# Patient Record
Sex: FEMALE | Race: WHITE | NOT HISPANIC OR LATINO | URBAN - METROPOLITAN AREA
[De-identification: names, ages, dates, MRNs, and addresses within clinical notes are randomized per-mention and may not be internally consistent; named-entity substitution may affect disease eponyms.]

---

## 2018-07-01 ENCOUNTER — OUTPATIENT (OUTPATIENT)
Dept: OUTPATIENT SERVICES | Facility: HOSPITAL | Age: 38
LOS: 1 days | End: 2018-07-01
Payer: COMMERCIAL

## 2018-07-10 DIAGNOSIS — Z71.89 OTHER SPECIFIED COUNSELING: ICD-10-CM

## 2019-07-01 ENCOUNTER — OUTPATIENT (OUTPATIENT)
Dept: OUTPATIENT SERVICES | Facility: HOSPITAL | Age: 39
LOS: 1 days | End: 2019-07-01
Payer: COMMERCIAL

## 2019-07-01 PROCEDURE — H0002: CPT

## 2019-10-30 DIAGNOSIS — Z71.89 OTHER SPECIFIED COUNSELING: ICD-10-CM

## 2020-11-01 PROCEDURE — G9005: CPT

## 2020-12-01 ENCOUNTER — OUTPATIENT (OUTPATIENT)
Dept: OUTPATIENT SERVICES | Facility: HOSPITAL | Age: 40
LOS: 1 days | End: 2020-12-01
Payer: COMMERCIAL

## 2020-12-01 PROCEDURE — H0002: CPT

## 2021-01-12 DIAGNOSIS — Z71.89 OTHER SPECIFIED COUNSELING: ICD-10-CM

## 2021-12-01 PROCEDURE — G9005: CPT

## 2022-02-08 ENCOUNTER — NON-APPOINTMENT (OUTPATIENT)
Age: 42
End: 2022-02-08

## 2022-02-08 PROBLEM — Z00.00 ENCOUNTER FOR PREVENTIVE HEALTH EXAMINATION: Status: ACTIVE | Noted: 2022-02-08

## 2022-02-09 ENCOUNTER — APPOINTMENT (OUTPATIENT)
Dept: INFECTIOUS DISEASE | Facility: CLINIC | Age: 42
End: 2022-02-09

## 2022-02-16 ENCOUNTER — NON-APPOINTMENT (OUTPATIENT)
Age: 42
End: 2022-02-16

## 2022-02-18 ENCOUNTER — NON-APPOINTMENT (OUTPATIENT)
Age: 42
End: 2022-02-18

## 2022-02-22 ENCOUNTER — APPOINTMENT (OUTPATIENT)
Dept: INFECTIOUS DISEASE | Facility: CLINIC | Age: 42
End: 2022-02-22

## 2022-02-24 ENCOUNTER — NON-APPOINTMENT (OUTPATIENT)
Age: 42
End: 2022-02-24

## 2022-02-25 ENCOUNTER — NON-APPOINTMENT (OUTPATIENT)
Age: 42
End: 2022-02-25

## 2022-02-28 ENCOUNTER — APPOINTMENT (OUTPATIENT)
Dept: INFECTIOUS DISEASE | Facility: CLINIC | Age: 42
End: 2022-02-28

## 2022-03-01 ENCOUNTER — NON-APPOINTMENT (OUTPATIENT)
Age: 42
End: 2022-03-01

## 2022-03-07 ENCOUNTER — APPOINTMENT (OUTPATIENT)
Dept: INFECTIOUS DISEASE | Facility: CLINIC | Age: 42
End: 2022-03-07

## 2022-03-15 ENCOUNTER — NON-APPOINTMENT (OUTPATIENT)
Age: 42
End: 2022-03-15

## 2022-03-16 ENCOUNTER — FORM ENCOUNTER (OUTPATIENT)
Age: 42
End: 2022-03-16

## 2022-03-16 ENCOUNTER — NON-APPOINTMENT (OUTPATIENT)
Age: 42
End: 2022-03-16

## 2022-03-17 ENCOUNTER — APPOINTMENT (OUTPATIENT)
Dept: INFECTIOUS DISEASE | Facility: CLINIC | Age: 42
End: 2022-03-17
Payer: COMMERCIAL

## 2022-03-17 ENCOUNTER — LABORATORY RESULT (OUTPATIENT)
Age: 42
End: 2022-03-17

## 2022-03-17 ENCOUNTER — NON-APPOINTMENT (OUTPATIENT)
Age: 42
End: 2022-03-17

## 2022-03-17 VITALS
SYSTOLIC BLOOD PRESSURE: 141 MMHG | TEMPERATURE: 97.3 F | DIASTOLIC BLOOD PRESSURE: 94 MMHG | BODY MASS INDEX: 22.66 KG/M2 | HEIGHT: 66 IN | WEIGHT: 141 LBS | HEART RATE: 101 BPM | OXYGEN SATURATION: 96 %

## 2022-03-17 DIAGNOSIS — I10 ESSENTIAL (PRIMARY) HYPERTENSION: ICD-10-CM

## 2022-03-17 LAB
25(OH)D3 SERPL-MCNC: 9 NG/ML
ALBUMIN SERPL ELPH-MCNC: 3.6 G/DL
ALP BLD-CCNC: 496 U/L
ALT SERPL-CCNC: 17 U/L
ANION GAP SERPL CALC-SCNC: 13 MMOL/L
AST SERPL-CCNC: 146 U/L
BASOPHILS # BLD AUTO: 0.02 K/UL
BASOPHILS NFR BLD AUTO: 0.3 %
BILIRUB SERPL-MCNC: 2.1 MG/DL
BUN SERPL-MCNC: 4 MG/DL
CALCIUM SERPL-MCNC: 9.1 MG/DL
CALCIUM SERPL-MCNC: 9.1 MG/DL
CD3 CELLS # BLD: 602 /UL
CD3 CELLS NFR BLD: 60 %
CD3+CD4+ CELLS # BLD: 391 /UL
CD3+CD4+ CELLS NFR BLD: 39 %
CD3+CD4+ CELLS/CD3+CD8+ CLL SPEC: 2.18 RATIO
CD3+CD8+ CELLS # SPEC: 180 /UL
CD3+CD8+ CELLS NFR BLD: 18 %
CHLORIDE SERPL-SCNC: 101 MMOL/L
CHOLEST SERPL-MCNC: 247 MG/DL
CO2 SERPL-SCNC: 24 MMOL/L
CREAT SERPL-MCNC: 0.37 MG/DL
CRP SERPL-MCNC: 5 MG/L
EGFR: 131 ML/MIN/1.73M2
EOSINOPHIL # BLD AUTO: 0.01 K/UL
EOSINOPHIL NFR BLD AUTO: 0.2 %
GLUCOSE SERPL-MCNC: 97 MG/DL
HCT VFR BLD CALC: 35 %
HDLC SERPL-MCNC: 23 MG/DL
HGB BLD-MCNC: 12.2 G/DL
IMM GRANULOCYTES NFR BLD AUTO: 0.6 %
LDLC SERPL CALC-MCNC: 194 MG/DL
LYMPHOCYTES # BLD AUTO: 1.26 K/UL
LYMPHOCYTES NFR BLD AUTO: 18.9 %
MAGNESIUM SERPL-MCNC: 1.9 MG/DL
MAN DIFF?: NORMAL
MCHC RBC-ENTMCNC: 31.9 PG
MCHC RBC-ENTMCNC: 34.9 GM/DL
MCV RBC AUTO: 91.4 FL
MONOCYTES # BLD AUTO: 0.93 K/UL
MONOCYTES NFR BLD AUTO: 14 %
NEUTROPHILS # BLD AUTO: 4.4 K/UL
NEUTROPHILS NFR BLD AUTO: 66 %
NONHDLC SERPL-MCNC: 225 MG/DL
PARATHYROID HORMONE INTACT: 28 PG/ML
PLATELET # BLD AUTO: 171 K/UL
POTASSIUM SERPL-SCNC: 4 MMOL/L
PROT SERPL-MCNC: 8.8 G/DL
RBC # BLD: 3.83 M/UL
RBC # FLD: 13.4 %
SODIUM SERPL-SCNC: 138 MMOL/L
T3FREE SERPL-MCNC: 2.18 PG/ML
T3RU NFR SERPL: 1 TBI
T4 SERPL-MCNC: 7.3 UG/DL
TRIGL SERPL-MCNC: 155 MG/DL
TSH SERPL-ACNC: 3.12 UIU/ML
VIT B12 SERPL-MCNC: 1362 PG/ML
WBC # FLD AUTO: 6.66 K/UL

## 2022-03-17 PROCEDURE — 99204 OFFICE O/P NEW MOD 45 MIN: CPT

## 2022-03-17 PROCEDURE — 99072 ADDL SUPL MATRL&STAF TM PHE: CPT

## 2022-03-17 NOTE — HISTORY OF PRESENT ILLNESS
[FreeTextEntry1] : 42 yo female here for HIV initial consult/ transfer of care. \par she takes Odefsey daily but ran out 3 days ago \par she has intermittent nausea and vomiting after taking medications but states Odefsey helps her. \par \par PMH : Dx 2/15/2015 via unprotected sexual relations.  denies any other medication use besides Odefsey.  \par denies any recent travel, hospitalizations.  denies any blood transfusions. \par PCP : Mercy Acuña -[phone]\par Vaccines : denies COVID or Influenza vaccines\par Screenings : Pap and mammo appt next week. \par PSH : left knee sx 10/15/2021- due to trauma.  \par PFH : Mother - HTN, DM, Father- HTN, DM\par Social hx : denies any IVDU, smoking, ETOH.   Confirms tattoos\par Sexual hx : Single. Heterosexual.  Last sexual encounter one year ago.  Confirms condom use.  \par Partner : none curretly \par Occupation : Unemployed\par Lives with : 2 children \par Who is aware of HIV status : friend\par \par NKDA\par Current Rx : Odefsey, Lisinopril\par \par 3/17/2022 Plan \par HIV \par 1. continue current treatment - refills given \par 2. do blood work and cultures\par 3. f/u one month \par \par Thrush\par 1. start Nystatin swish and spit/swallow QID\par \par Cerumen impaction \par 1. referral to ENT given \par \par Thyromegaly \par 1. US thyroid ordered \par 2. blood work ordered\par 3. referral to Endocrine given \par \par HTN\par 1. continue current treatment - refills given \par \par Neuralgia\par pt states numbness and tingling bilat feet- worse in AM \par 1. do blood work for further evaluation.  \par \par \par \par

## 2022-03-17 NOTE — REVIEW OF SYSTEMS
[Body Aches] : body aches [Joint Pain] : joint pain [Negative] : Heme/Lymph [FreeTextEntry2] : left knee pain [FreeTextEntry9] : right knee pain

## 2022-03-17 NOTE — ASSESSMENT
[FreeTextEntry1] : HIV initial consult/ transfer of care  [Treatment Education] : treatment education [Treatment Adherence] : treatment adherence [Rx Dose / Side Effects] : Rx dose/side effects [Medical Care Issues] : medical care issues [Drug Interactions / Side Effects] : drug interactions/side effects [HIV Education] : HIV Education

## 2022-03-17 NOTE — PHYSICAL EXAM
[General Appearance - Alert] : alert [General Appearance - In No Acute Distress] : in no acute distress [General Appearance - Well Nourished] : well nourished [General Appearance - Well-Appearing] : healthy appearing [Sclera] : the sclera and conjunctiva were normal [PERRL With Normal Accommodation] : pupils were equal in size, round, reactive to light [Extraocular Movements] : extraocular movements were intact [Outer Ear] : the ears and nose were normal in appearance [Hearing Threshold Finger Rub Not [place]] : hearing was normal [Examination Of The Oral Cavity] : the lips and gums were normal [Both Tympanic Membranes Were Examined] : both tympanic membranes were normal [Neck Appearance] : the appearance of the neck was normal [Neck Cervical Mass (___cm)] : no neck mass was observed [Jugular Venous Distention Increased] : there was no jugular-venous distention [Respiration, Rhythm And Depth] : normal respiratory rhythm and effort [Exaggerated Use Of Accessory Muscles For Inspiration] : no accessory muscle use [Auscultation Breath Sounds / Voice Sounds] : lungs were clear to auscultation bilaterally [Heart Rate And Rhythm] : heart rate was normal and rhythm regular [Heart Sounds] : normal S1 and S2 [Heart Sounds Gallop] : no gallops [Murmurs] : no murmurs [Heart Sounds Pericardial Friction Rub] : no pericardial rub [Edema] : there was no peripheral edema [Bowel Sounds] : normal bowel sounds [Abdomen Soft] : soft [Abdomen Tenderness] : non-tender [Costovertebral Angle Tenderness] : no CVA tenderness [No Palpable Adenopathy] : no palpable adenopathy [Range of Motion to Joints] : range of motion to joints [Nail Clubbing] : no clubbing  or cyanosis of the fingernails [FreeTextEntry1] : mild inflammation of left knee, tenderness, diminished strength 4/5, full ROJM [Skin Color & Pigmentation] : normal skin color and pigmentation [] : no rash [Skin Lesions] : no skin lesions [Cranial Nerves] : cranial nerves 2-12 were intact [Sensation] : the sensory exam was normal to light touch and pinprick [Motor Exam] : the motor exam was normal [No Focal Deficits] : no focal deficits [Oriented To Time, Place, And Person] : oriented to person, place, and time [Affect] : the affect was normal

## 2022-03-18 ENCOUNTER — NON-APPOINTMENT (OUTPATIENT)
Age: 42
End: 2022-03-18

## 2022-03-18 LAB
APPEARANCE: ABNORMAL
BACTERIA: NEGATIVE
BILIRUBIN URINE: ABNORMAL
BLOOD URINE: NEGATIVE
C TRACH RRNA SPEC QL NAA+PROBE: NOT DETECTED
C TRACH RRNA SPEC QL NAA+PROBE: NOT DETECTED
COLOR: YELLOW
ERYTHROCYTE [SEDIMENTATION RATE] IN BLOOD BY WESTERGREN METHOD: 105 MM/HR
ESTIMATED AVERAGE GLUCOSE: 97 MG/DL
GLUCOSE QUALITATIVE U: NEGATIVE
HBA1C MFR BLD HPLC: 5 %
HBV CORE IGG+IGM SER QL: NONREACTIVE
HBV SURFACE AB SER QL: NONREACTIVE
HBV SURFACE AG SER QL: NONREACTIVE
HCV AB SER QL: NONREACTIVE
HCV S/CO RATIO: 0.2 S/CO
HEPATITIS A IGG ANTIBODY: REACTIVE
HEPB DNA PCR INT: NOT DETECTED
HEPB DNA PCR LOG: NOT DETECTED LOGIU/ML
HIV1 RNA # SERPL NAA+PROBE: ABNORMAL
HIV1 RNA # SERPL NAA+PROBE: NORMAL
HYALINE CASTS: 0 /LPF
KETONES URINE: NEGATIVE
LEUKOCYTE ESTERASE URINE: ABNORMAL
MEV IGG FLD QL IA: 88 AU/ML
MEV IGG+IGM SER-IMP: POSITIVE
MICROSCOPIC-UA: NORMAL
MUV AB SER-ACNC: POSITIVE
MUV IGG SER QL IA: 90.2 AU/ML
N GONORRHOEA RRNA SPEC QL NAA+PROBE: NOT DETECTED
N GONORRHOEA RRNA SPEC QL NAA+PROBE: NOT DETECTED
NITRITE URINE: NEGATIVE
PH URINE: 6.5
PROTEIN URINE: ABNORMAL
RED BLOOD CELLS URINE: 1 /HPF
RUBV IGG FLD-ACNC: 2.1 INDEX
RUBV IGG SER-IMP: POSITIVE
SOURCE AMPLIFICATION: NORMAL
SOURCE AMPLIFICATION: NORMAL
SOURCE ORAL: NORMAL
SPECIFIC GRAVITY URINE: 1.01
SQUAMOUS EPITHELIAL CELLS: 6 /HPF
T GONDII AB SER-IMP: NEGATIVE
T GONDII AB SER-IMP: NEGATIVE
T GONDII IGG SER QL: <3 IU/ML
T GONDII IGM SER QL: <3 AU/ML
T PALLIDUM AB SER QL IA: NEGATIVE
T VAGINALIS RRNA SPEC QL NAA+PROBE: NOT DETECTED
THYROGLOB AB SERPL-ACNC: <20 IU/ML
THYROPEROXIDASE AB SERPL IA-ACNC: <10 IU/ML
UROBILINOGEN URINE: ABNORMAL
VIRAL LOAD INTERP: NORMAL
VIRAL LOAD LOG: 5.22
VZV AB TITR SER: POSITIVE
VZV IGG SER IF-ACNC: 2459 INDEX
WHITE BLOOD CELLS URINE: 7 /HPF

## 2022-03-21 ENCOUNTER — NON-APPOINTMENT (OUTPATIENT)
Age: 42
End: 2022-03-21

## 2022-03-21 LAB
ANA SER IF-ACNC: NEGATIVE
M TB IFN-G BLD-IMP: NEGATIVE
QUANTIFERON TB PLUS MITOGEN MINUS NIL: 7.12 IU/ML
QUANTIFERON TB PLUS NIL: 0.08 IU/ML
QUANTIFERON TB PLUS TB1 MINUS NIL: 0 IU/ML
QUANTIFERON TB PLUS TB2 MINUS NIL: 0 IU/ML

## 2022-03-22 ENCOUNTER — NON-APPOINTMENT (OUTPATIENT)
Age: 42
End: 2022-03-22

## 2022-03-23 ENCOUNTER — NON-APPOINTMENT (OUTPATIENT)
Age: 42
End: 2022-03-23

## 2022-03-23 LAB — HLA-B*57:01 QL: NEGATIVE

## 2022-03-25 ENCOUNTER — NON-APPOINTMENT (OUTPATIENT)
Age: 42
End: 2022-03-25

## 2022-03-29 ENCOUNTER — NON-APPOINTMENT (OUTPATIENT)
Age: 42
End: 2022-03-29

## 2022-04-01 ENCOUNTER — NON-APPOINTMENT (OUTPATIENT)
Age: 42
End: 2022-04-01

## 2022-04-05 ENCOUNTER — NON-APPOINTMENT (OUTPATIENT)
Age: 42
End: 2022-04-05

## 2022-04-06 LAB — VIT B6 SERPL-MCNC: 2.3 UG/L

## 2022-04-12 ENCOUNTER — NON-APPOINTMENT (OUTPATIENT)
Age: 42
End: 2022-04-12

## 2022-04-14 ENCOUNTER — NON-APPOINTMENT (OUTPATIENT)
Age: 42
End: 2022-04-14

## 2022-04-15 ENCOUNTER — NON-APPOINTMENT (OUTPATIENT)
Age: 42
End: 2022-04-15

## 2022-04-17 ENCOUNTER — FORM ENCOUNTER (OUTPATIENT)
Age: 42
End: 2022-04-17

## 2022-04-18 ENCOUNTER — NON-APPOINTMENT (OUTPATIENT)
Age: 42
End: 2022-04-18

## 2022-04-18 ENCOUNTER — APPOINTMENT (OUTPATIENT)
Dept: INFECTIOUS DISEASE | Facility: CLINIC | Age: 42
End: 2022-04-18
Payer: COMMERCIAL

## 2022-04-18 VITALS
SYSTOLIC BLOOD PRESSURE: 136 MMHG | WEIGHT: 139 LBS | OXYGEN SATURATION: 97 % | TEMPERATURE: 97.6 F | DIASTOLIC BLOOD PRESSURE: 87 MMHG | HEIGHT: 66 IN | BODY MASS INDEX: 22.34 KG/M2 | HEART RATE: 101 BPM

## 2022-04-18 DIAGNOSIS — E01.0 IODINE-DEFICIENCY RELATED DIFFUSE (ENDEMIC) GOITER: ICD-10-CM

## 2022-04-18 DIAGNOSIS — E78.2 MIXED HYPERLIPIDEMIA: ICD-10-CM

## 2022-04-18 DIAGNOSIS — B37.0 CANDIDAL STOMATITIS: ICD-10-CM

## 2022-04-18 DIAGNOSIS — M79.2 NEURALGIA AND NEURITIS, UNSPECIFIED: ICD-10-CM

## 2022-04-18 DIAGNOSIS — R70.0 ELEVATED ERYTHROCYTE SEDIMENTATION RATE: ICD-10-CM

## 2022-04-18 DIAGNOSIS — R74.8 ABNORMAL LEVELS OF OTHER SERUM ENZYMES: ICD-10-CM

## 2022-04-18 LAB
HIV GENOSURE ARCHIVE 1: NORMAL
HIV1 PROVIR DNA RT + PR + IN MUT DET SEQ: NORMAL
HIV1 PROVIRAL DNA GENTYP BLD MC NAR: NORMAL

## 2022-04-18 PROCEDURE — 99214 OFFICE O/P EST MOD 30 MIN: CPT

## 2022-04-18 RX ORDER — MEDROXYPROGESTERONE ACETATE 150 MG/ML
150 INJECTION, SUSPENSION INTRAMUSCULAR
Qty: 1 | Refills: 0 | Status: COMPLETED | COMMUNITY
Start: 2022-02-01

## 2022-04-18 RX ORDER — OXYCODONE AND ACETAMINOPHEN 5; 325 MG/1; MG/1
5-325 TABLET ORAL
Qty: 30 | Refills: 0 | Status: COMPLETED | COMMUNITY
Start: 2021-12-07

## 2022-04-18 RX ORDER — RISPERIDONE 1 MG/1
1 TABLET, FILM COATED ORAL
Qty: 30 | Refills: 0 | Status: COMPLETED | COMMUNITY
Start: 2021-11-26

## 2022-04-18 NOTE — HISTORY OF PRESENT ILLNESS
[FreeTextEntry1] : 42 yo female here for HIV f/u consult and results discussion\par taking Odefsey daily with one missed doses \par denies any SE from medication\par \par \par From previous consult 3/17/2022 : \par 42 yo female here for HIV initial consult/ transfer of care. \par she takes Odefsey daily but ran out 3 days ago \par she has intermittent nausea and vomiting after taking medications but states Odefsey helps her. \par \par PMH : Dx 2/15/2015 via unprotected sexual relations. denies any other medication use besides Odefsey. \par denies any recent travel, hospitalizations. denies any blood transfusions. \par PCP : Mercy Acuña -[phone]\par Vaccines : denies COVID or Influenza vaccines\par Screenings : Pap and mammo appt next week. \par PSH : left knee sx 10/15/2021- due to trauma. \par PFH : Mother - HTN, DM, Father- HTN, DM\par Social hx : denies any IVDU, smoking, ETOH. Confirms tattoos\par Sexual hx : Single. Heterosexual. Last sexual encounter one year ago. Confirms condom use. \par Partner : none curretly \par Occupation : Unemployed\par Lives with : 2 children \par Who is aware of HIV status : friend\par \par NKDA\par \par \par 4/18/2022 Plan \par HIV \par reminded pt to take medications daily as high VL noted, CD4 391. \par 1. continue current treatment \par 2. f/u 3 months \par \par Thrush\par pt taking Nystatin swish daily \par 1. continue current treatment \par \par Thyromegaly\par 1. schedule US thyroid and f/u Endocrine for further evaluation \par \par Neuralgia\par noted with elevated ESR/ CRP on blood work.  Continues with numbness/tingling bilat feet\par 1. referral to Rheumatology given \par \par Low Vit D \par 1. continue Vit D one tab twice per week \par \par Mixed hyperlipidemia\par 1. Start Atorvastatin one tab nightly. \par \par Elevated liver enzymes\par 1. do US abdomen and f/u with hepatology for further evaluation \par \par \par \par  \par

## 2022-04-18 NOTE — REVIEW OF SYSTEMS
[Anxiety] : anxiety [Depression] : depression [Negative] : Heme/Lymph [Normal Appetite] : appetite not normal

## 2022-04-18 NOTE — ASSESSMENT
[Treatment Education] : treatment education [Treatment Adherence] : treatment adherence [Rx Dose / Side Effects] : Rx dose/side effects [Medical Care Issues] : medical care issues [FreeTextEntry1] : HIV f/u consult and results discussion

## 2022-04-18 NOTE — PHYSICAL EXAM
[General Appearance - Alert] : alert [General Appearance - In No Acute Distress] : in no acute distress [General Appearance - Well Nourished] : well nourished [General Appearance - Well-Appearing] : healthy appearing [Sclera] : the sclera and conjunctiva were normal [PERRL With Normal Accommodation] : pupils were equal in size, round, reactive to light [Extraocular Movements] : extraocular movements were intact [Outer Ear] : the ears and nose were normal in appearance [Hearing Threshold Finger Rub Not [place]] : hearing was normal [Examination Of The Oral Cavity] : the lips and gums were normal [Both Tympanic Membranes Were Examined] : both tympanic membranes were normal [Neck Appearance] : the appearance of the neck was normal [Jugular Venous Distention Increased] : there was no jugular-venous distention [Neck Cervical Mass (___cm)] : no neck mass was observed [Respiration, Rhythm And Depth] : normal respiratory rhythm and effort [Exaggerated Use Of Accessory Muscles For Inspiration] : no accessory muscle use [Auscultation Breath Sounds / Voice Sounds] : lungs were clear to auscultation bilaterally [Heart Rate And Rhythm] : heart rate was normal and rhythm regular [Heart Sounds] : normal S1 and S2 [Heart Sounds Gallop] : no gallops [Murmurs] : no murmurs [Heart Sounds Pericardial Friction Rub] : no pericardial rub [Edema] : there was no peripheral edema [Bowel Sounds] : normal bowel sounds [Abdomen Soft] : soft [Abdomen Tenderness] : non-tender [Costovertebral Angle Tenderness] : no CVA tenderness [No Palpable Adenopathy] : no palpable adenopathy [Range of Motion to Joints] : range of motion to joints [Nail Clubbing] : no clubbing  or cyanosis of the fingernails [Skin Color & Pigmentation] : normal skin color and pigmentation [] : no rash [Skin Lesions] : no skin lesions [Cranial Nerves] : cranial nerves 2-12 were intact [Sensation] : the sensory exam was normal to light touch and pinprick [Motor Exam] : the motor exam was normal [No Focal Deficits] : no focal deficits [Oriented To Time, Place, And Person] : oriented to person, place, and time [Affect] : the affect was normal [FreeTextEntry1] : mild inflammation of left knee, tenderness, diminished strength 4/5, full ROJM

## 2022-04-19 ENCOUNTER — NON-APPOINTMENT (OUTPATIENT)
Age: 42
End: 2022-04-19

## 2022-04-20 ENCOUNTER — NON-APPOINTMENT (OUTPATIENT)
Age: 42
End: 2022-04-20

## 2022-04-27 ENCOUNTER — NON-APPOINTMENT (OUTPATIENT)
Age: 42
End: 2022-04-27

## 2022-04-28 ENCOUNTER — APPOINTMENT (OUTPATIENT)
Dept: INFECTIOUS DISEASE | Facility: CLINIC | Age: 42
End: 2022-04-28

## 2022-05-02 ENCOUNTER — NON-APPOINTMENT (OUTPATIENT)
Age: 42
End: 2022-05-02

## 2022-05-03 ENCOUNTER — NON-APPOINTMENT (OUTPATIENT)
Age: 42
End: 2022-05-03

## 2022-05-05 ENCOUNTER — APPOINTMENT (OUTPATIENT)
Dept: INFECTIOUS DISEASE | Facility: CLINIC | Age: 42
End: 2022-05-05

## 2022-05-09 ENCOUNTER — NON-APPOINTMENT (OUTPATIENT)
Age: 42
End: 2022-05-09

## 2022-05-10 ENCOUNTER — NON-APPOINTMENT (OUTPATIENT)
Age: 42
End: 2022-05-10

## 2022-05-19 ENCOUNTER — NON-APPOINTMENT (OUTPATIENT)
Age: 42
End: 2022-05-19

## 2022-05-20 ENCOUNTER — NON-APPOINTMENT (OUTPATIENT)
Age: 42
End: 2022-05-20

## 2022-05-23 ENCOUNTER — NON-APPOINTMENT (OUTPATIENT)
Age: 42
End: 2022-05-23

## 2022-05-24 ENCOUNTER — NON-APPOINTMENT (OUTPATIENT)
Age: 42
End: 2022-05-24

## 2022-05-27 ENCOUNTER — NON-APPOINTMENT (OUTPATIENT)
Age: 42
End: 2022-05-27

## 2022-05-31 ENCOUNTER — NON-APPOINTMENT (OUTPATIENT)
Age: 42
End: 2022-05-31

## 2022-06-09 ENCOUNTER — APPOINTMENT (OUTPATIENT)
Dept: RHEUMATOLOGY | Facility: CLINIC | Age: 42
End: 2022-06-09

## 2022-06-15 ENCOUNTER — APPOINTMENT (OUTPATIENT)
Dept: ULTRASOUND IMAGING | Facility: CLINIC | Age: 42
End: 2022-06-15

## 2022-06-16 ENCOUNTER — NON-APPOINTMENT (OUTPATIENT)
Age: 42
End: 2022-06-16

## 2022-06-20 ENCOUNTER — APPOINTMENT (OUTPATIENT)
Dept: INTERNAL MEDICINE | Facility: CLINIC | Age: 42
End: 2022-06-20

## 2022-06-20 ENCOUNTER — NON-APPOINTMENT (OUTPATIENT)
Age: 42
End: 2022-06-20

## 2022-06-21 ENCOUNTER — NON-APPOINTMENT (OUTPATIENT)
Age: 42
End: 2022-06-21

## 2022-06-27 ENCOUNTER — NON-APPOINTMENT (OUTPATIENT)
Age: 42
End: 2022-06-27

## 2022-07-05 ENCOUNTER — NON-APPOINTMENT (OUTPATIENT)
Age: 42
End: 2022-07-05

## 2022-07-05 ENCOUNTER — APPOINTMENT (OUTPATIENT)
Dept: HEPATOLOGY | Facility: CLINIC | Age: 42
End: 2022-07-05

## 2022-07-06 ENCOUNTER — RX RENEWAL (OUTPATIENT)
Age: 42
End: 2022-07-06

## 2022-07-06 ENCOUNTER — NON-APPOINTMENT (OUTPATIENT)
Age: 42
End: 2022-07-06

## 2022-07-13 ENCOUNTER — NON-APPOINTMENT (OUTPATIENT)
Age: 42
End: 2022-07-13

## 2022-07-15 ENCOUNTER — NON-APPOINTMENT (OUTPATIENT)
Age: 42
End: 2022-07-15

## 2022-07-18 ENCOUNTER — APPOINTMENT (OUTPATIENT)
Dept: INFECTIOUS DISEASE | Facility: CLINIC | Age: 42
End: 2022-07-18

## 2022-07-18 ENCOUNTER — NON-APPOINTMENT (OUTPATIENT)
Age: 42
End: 2022-07-18

## 2022-07-19 ENCOUNTER — NON-APPOINTMENT (OUTPATIENT)
Age: 42
End: 2022-07-19

## 2022-07-19 ENCOUNTER — APPOINTMENT (OUTPATIENT)
Dept: HEPATOLOGY | Facility: CLINIC | Age: 42
End: 2022-07-19

## 2022-07-20 ENCOUNTER — NON-APPOINTMENT (OUTPATIENT)
Age: 42
End: 2022-07-20

## 2022-07-21 ENCOUNTER — NON-APPOINTMENT (OUTPATIENT)
Age: 42
End: 2022-07-21

## 2022-07-27 ENCOUNTER — NON-APPOINTMENT (OUTPATIENT)
Age: 42
End: 2022-07-27

## 2022-08-05 ENCOUNTER — NON-APPOINTMENT (OUTPATIENT)
Age: 42
End: 2022-08-05

## 2022-08-08 ENCOUNTER — NON-APPOINTMENT (OUTPATIENT)
Age: 42
End: 2022-08-08

## 2022-08-09 ENCOUNTER — NON-APPOINTMENT (OUTPATIENT)
Age: 42
End: 2022-08-09

## 2022-08-11 ENCOUNTER — NON-APPOINTMENT (OUTPATIENT)
Age: 42
End: 2022-08-11

## 2022-08-12 ENCOUNTER — NON-APPOINTMENT (OUTPATIENT)
Age: 42
End: 2022-08-12

## 2022-08-17 ENCOUNTER — NON-APPOINTMENT (OUTPATIENT)
Age: 42
End: 2022-08-17

## 2022-08-18 ENCOUNTER — NON-APPOINTMENT (OUTPATIENT)
Age: 42
End: 2022-08-18

## 2022-08-22 ENCOUNTER — APPOINTMENT (OUTPATIENT)
Dept: OTOLARYNGOLOGY | Facility: CLINIC | Age: 42
End: 2022-08-22

## 2022-08-22 ENCOUNTER — NON-APPOINTMENT (OUTPATIENT)
Age: 42
End: 2022-08-22

## 2022-09-01 ENCOUNTER — RX RENEWAL (OUTPATIENT)
Age: 42
End: 2022-09-01

## 2022-09-08 ENCOUNTER — NON-APPOINTMENT (OUTPATIENT)
Age: 42
End: 2022-09-08

## 2022-10-04 ENCOUNTER — NON-APPOINTMENT (OUTPATIENT)
Age: 42
End: 2022-10-04

## 2022-10-05 ENCOUNTER — APPOINTMENT (OUTPATIENT)
Dept: ENDOCRINOLOGY | Facility: CLINIC | Age: 42
End: 2022-10-05

## 2022-10-05 ENCOUNTER — NON-APPOINTMENT (OUTPATIENT)
Age: 42
End: 2022-10-05

## 2022-10-07 ENCOUNTER — NON-APPOINTMENT (OUTPATIENT)
Age: 42
End: 2022-10-07

## 2022-10-12 ENCOUNTER — FORM ENCOUNTER (OUTPATIENT)
Age: 42
End: 2022-10-12

## 2022-10-12 ENCOUNTER — NON-APPOINTMENT (OUTPATIENT)
Age: 42
End: 2022-10-12

## 2022-10-13 ENCOUNTER — APPOINTMENT (OUTPATIENT)
Dept: INFECTIOUS DISEASE | Facility: CLINIC | Age: 42
End: 2022-10-13

## 2022-10-13 ENCOUNTER — NON-APPOINTMENT (OUTPATIENT)
Age: 42
End: 2022-10-13

## 2022-10-13 ENCOUNTER — LABORATORY RESULT (OUTPATIENT)
Age: 42
End: 2022-10-13

## 2022-10-13 VITALS
OXYGEN SATURATION: 100 % | DIASTOLIC BLOOD PRESSURE: 69 MMHG | TEMPERATURE: 97.8 F | WEIGHT: 146 LBS | BODY MASS INDEX: 23.46 KG/M2 | SYSTOLIC BLOOD PRESSURE: 114 MMHG | HEART RATE: 85 BPM | HEIGHT: 66 IN

## 2022-10-13 DIAGNOSIS — G47.00 INSOMNIA, UNSPECIFIED: ICD-10-CM

## 2022-10-13 DIAGNOSIS — Z92.89 PERSONAL HISTORY OF OTHER MEDICAL TREATMENT: ICD-10-CM

## 2022-10-13 DIAGNOSIS — F33.1 MAJOR DEPRESSIVE DISORDER, RECURRENT, MODERATE: ICD-10-CM

## 2022-10-13 DIAGNOSIS — R21 RASH AND OTHER NONSPECIFIC SKIN ERUPTION: ICD-10-CM

## 2022-10-13 PROCEDURE — 99215 OFFICE O/P EST HI 40 MIN: CPT

## 2022-10-13 PROCEDURE — 99204 OFFICE O/P NEW MOD 45 MIN: CPT

## 2022-10-13 RX ORDER — CYCLOBENZAPRINE HYDROCHLORIDE 10 MG/1
10 TABLET, FILM COATED ORAL
Qty: 40 | Refills: 0 | Status: COMPLETED | COMMUNITY
Start: 2022-02-01 | End: 2022-10-13

## 2022-10-13 RX ORDER — NUTRITIONAL SUPPLEMENT 0.06 G-1.5
LIQUID (ML) ORAL
Qty: 30 | Refills: 6 | Status: ACTIVE | COMMUNITY
Start: 2022-04-19 | End: 1900-01-01

## 2022-10-13 RX ORDER — NYSTATIN 100000 [USP'U]/ML
100000 SUSPENSION ORAL
Qty: 600 | Refills: 4 | Status: ACTIVE | COMMUNITY
Start: 2022-03-17 | End: 1900-01-01

## 2022-10-13 RX ORDER — GABAPENTIN 300 MG/1
300 CAPSULE ORAL
Qty: 30 | Refills: 0 | Status: COMPLETED | COMMUNITY
Start: 2022-02-23 | End: 2022-10-13

## 2022-10-13 RX ORDER — HYDROCORTISONE 10 MG/G
1 CREAM TOPICAL
Qty: 1 | Refills: 4 | Status: ACTIVE | COMMUNITY
Start: 2022-10-13 | End: 1900-01-01

## 2022-10-13 RX ORDER — ELECTROLYTES/DEXTROSE
PACKET (EA) ORAL
Qty: 1 | Refills: 0 | Status: COMPLETED | COMMUNITY
Start: 2022-05-31 | End: 2022-10-13

## 2022-10-13 NOTE — HISTORY OF PRESENT ILLNESS
[Not Applicable] : Not applicable [FreeTextEntry1] : Pt is a 41 yr old woman with a hx of HIV diagnosed in February 2022, here today with complaints of feeling overwhelmed and having severe insomnia.  She says that she is unable to sleep because she is thinking of all the issues that worry her.  She is now living with her 7 yr old son since her 20 yr old daughter moved out.  She had been whelmed as her daughter had been leaving her baby daughter with pt.  She says that she had been seeing a psychiatrist who was treating her with Risperidone but she stopped seeing the psychiatrist and stopped taking Risperidone which she feels was not helpful.  She agrees that Seroquel may be helpful since this may help her sleep.  She says that her mother suffers from mental health problems and had been on Seroquel which helped her.  She denies having any past history of any suicidality and denies any history of paranoia or other symptoms of psychosis. She has no hx of substance use. She had been working in sales but currently unemployed.   She admits that she had felt depressed and anxious but is able to enjoy relaxing at home watching TV (Law and Order and other shows). She denies having any past history of psychiatric hospitalizations.  She is interested in returning to the clinic for follow up.

## 2022-10-13 NOTE — PHYSICAL EXAM
[General Appearance - Alert] : alert [General Appearance - In No Acute Distress] : in no acute distress [General Appearance - Well Nourished] : well nourished [PERRL With Normal Accommodation] : pupils were equal in size, round, reactive to light [Extraocular Movements] : extraocular movements were intact [Yellow Sclera (Icteric)] : the sclera were icteric [Outer Ear] : the ears and nose were normal in appearance [Hearing Threshold Finger Rub Not [place]] : hearing was normal [Completely Obscured] : completely [Cerumen in Canal] : by cerumen [Neck Appearance] : the appearance of the neck was normal [Neck Cervical Mass (___cm)] : no neck mass was observed [Jugular Venous Distention Increased] : there was no jugular-venous distention [] : no respiratory distress [Respiration, Rhythm And Depth] : normal respiratory rhythm and effort [Exaggerated Use Of Accessory Muscles For Inspiration] : no accessory muscle use [Auscultation Breath Sounds / Voice Sounds] : lungs were clear to auscultation bilaterally [Heart Rate And Rhythm] : heart rate was normal and rhythm regular [Heart Sounds] : normal S1 and S2 [Heart Sounds Gallop] : no gallops [Murmurs] : no murmurs [Heart Sounds Pericardial Friction Rub] : no pericardial rub [Edema] : there was no peripheral edema [Bowel Sounds] : normal bowel sounds [Abdomen Soft] : soft [Abdomen Tenderness] : non-tender [Abdomen Mass (___ Cm)] : no abdominal mass palpated [Costovertebral Angle Tenderness] : no CVA tenderness [Liver Enlargement] : was enlarged [No Palpable Adenopathy] : no palpable adenopathy [Musculoskeletal - Swelling] : no joint swelling [Range of Motion to Joints] : range of motion to joints [Nail Clubbing] : no clubbing  or cyanosis of the fingernails [Motor Tone] : muscle strength and tone were normal [Skin Lesions] : no skin lesions [Cranial Nerves] : cranial nerves 2-12 were intact [Sensation] : the sensory exam was normal to light touch and pinprick [Motor Exam] : the motor exam was normal [No Focal Deficits] : no focal deficits [Oriented To Time, Place, And Person] : oriented to person, place, and time [Affect] : the affect was normal [FreeTextEntry1] : jaundice, mild torso rash

## 2022-10-13 NOTE — REASON FOR VISIT
[Number can be texted] : number can be texted [OK  to leave message] : OK  to leave message [FreeTextEntry5] : English [Primary Care] : Primary Care [[facility] Provider/Facility] : [facility] Provider/Facility [Patient] : Patient [FreeTextEntry2] : insomnia/depression [FreeTextEntry1] : "I can't sleep because I have too much on my mind."

## 2022-10-13 NOTE — DISCUSSION/SUMMARY
[FreeTextEntry1] : Pt is a 41 yr old woman with a hx of HIV diagnosed in February 2022, here today with complaints of feeling overwhelmed and having severe insomnia.  She says that she is unable to sleep because she is thinking of all the issues that worry her.  She is now living with her 7 yr old son since her 20 yr old daughter moved out.  She had been whelmed as her daughter had been leaving her baby daughter with pt.  She says that she had been seeing a psychiatrist who was treating her with Risperidone but she stopped seeing the psychiatrist and stopped taking Risperidone which she feels was not helpful.  She agrees that Seroquel may be helpful since this may help her sleep.  She says that her mother suffers from mental health problems and had been on Seroquel which helped her.  She denies having any past history of any suicidality and denies any history of paranoia or other symptoms of psychosis.  She admits that she had felt depressed and anxious but is able to enjoy relaxing at home watching TV (Law and Order and other shows). She denies having any past history of psychiatric hospitalizations.  She is interested in returning to the clinic for follow up.  \par Plan \par Start Seroquel 25mg po qhs (may take 50mg if needed)\par RTC in two weeks.  [No] : No [Yes] : Safety Plan completed/updated (for individuals at risk): Yes

## 2022-10-13 NOTE — RISK ASSESSMENT
[Clinical Interview] : Clinical Interview [No] : No [No known suicide factors] : No known suicide factors [Depressed mood/Anhedonia] : depressed mood/anhedonia [Global insomnia] : global insomnia [Identifies reasons for living] : identifies reasons for living [Cultural, spiritual and/or moral attitudes against suicide] : cultural, spiritual and/or moral attitudes against suicide [Responsibility to children, family, or others] : responsibility to children, family, or others [None in the patient's lifetime] : None in the patient's lifetime [None Known] : none known [Residential stability] : residential stability [Relationship stability] : relationship stability

## 2022-10-13 NOTE — SOCIAL HISTORY
[FreeTextEntry1] : PT was born and raised in [place].  She has two children 7 yr old son and 20 yr old daughter.  She has no hx of substance use.Had been working in sales but currently unemployed.

## 2022-10-13 NOTE — PSYCHOSOCIAL ASSESSMENT
[None known] : None known [HS Diploma or GED] : HS Diploma or GED [Unemployed and looking for work] : unemployed and looking for work [Client's child, stepchild, foster child, grandchild] : client's child, stepchild, foster child, grandchild [No] : No

## 2022-10-13 NOTE — HISTORY OF PRESENT ILLNESS
[FreeTextEntry1] : 40 yo female here for HIV f/u consult and hospital discharge f/u \par taking Odefsey daily with no missed doses or SE \par \par she had episode of LOC last month \par she went to [place] ER\par was admitted to ICU\par given 4 units PRBC\par noted with low sodium as well \par given IVF and IV abx. \par unsure of cause of symptoms or dx\par was discharged 7 days later\par has f/u with GI in 2 weeks\par currently improved with symptoms \par maintains abdominal distension.  \par \par \par From previous consult 4/18/2022 : \par 40 yo female here for HIV f/u consult and results discussion\par taking Odefsey daily with one missed doses \par denies any SE from medication\par \par \par From previous consult 3/17/2022 : \par 40 yo female here for HIV initial consult/ transfer of care. \par she takes Odefsey daily but ran out 3 days ago \par she has intermittent nausea and vomiting after taking medications but states Odefsey helps her. \par \par PMH : Dx 2/15/2015 via unprotected sexual relations. denies any other medication use besides Odefsey. \par denies any recent travel, hospitalizations. denies any blood transfusions. \par PCP : Mercy Acuña -[phone]\par Vaccines : denies COVID or Influenza vaccines\par Screenings : Pap and mammo appt next week. \par PSH : left knee sx 10/15/2021- due to trauma. \par PFH : Mother - HTN, DM, Father- HTN, DM\par Social hx : denies any IVDU, smoking, ETOH. Confirms tattoos\par Sexual hx : Single. Heterosexual. Last sexual encounter one year ago. Confirms condom use. \par Partner : none curretly \par Occupation : Unemployed\par Lives with : 2 children \par Who is aware of HIV status : friend\par \par NKDA\par \par \par \par 10/13/2022 Plan \par HIV \par 1. continue current treatment - refills given \par 2. do blood work \par 3. f/u one month\par \par Thyromegaly\par 1. do US thyroid \par 2. consult with endocrine for further evaluation \par \par Jaundice \par pt currently with jaundice, abdominal ascites.  Pending GI consult in 2 weeks.  \par 1. do blood work \par 2. f/u with GI as scheduled for further management.  \par \par Rash\par 1. start Hydrocortisone cream twice per day \par \par \par

## 2022-10-13 NOTE — ASSESSMENT
[FreeTextEntry1] : HIV f/u consult.  HIV stable.  [Treatment Education] : treatment education [Treatment Adherence] : treatment adherence [Rx Dose / Side Effects] : Rx dose/side effects [Nutritional / Food Issues] : nutritional/food issues [Medical Care Issues] : medical care issues [Drug Interactions / Side Effects] : drug interactions/side effects [HIV Education] : HIV Education

## 2022-10-14 LAB
25(OH)D3 SERPL-MCNC: 22.8 NG/ML
ALBUMIN SERPL ELPH-MCNC: 2.6 G/DL
ALP BLD-CCNC: 179 U/L
ALT SERPL-CCNC: 16 U/L
ANION GAP SERPL CALC-SCNC: 8 MMOL/L
APTT BLD: 47.4 SEC
AST SERPL-CCNC: 58 U/L
BASOPHILS # BLD AUTO: 0.05 K/UL
BASOPHILS NFR BLD AUTO: 0.7 %
BILIRUB SERPL-MCNC: 3.3 MG/DL
BUN SERPL-MCNC: 8 MG/DL
CALCIUM SERPL-MCNC: 8.3 MG/DL
CD3 CELLS # BLD: 910 /UL
CD3 CELLS NFR BLD: 73 %
CD3+CD4+ CELLS # BLD: 718 /UL
CD3+CD4+ CELLS NFR BLD: 58 %
CD3+CD4+ CELLS/CD3+CD8+ CLL SPEC: 4.28 RATIO
CD3+CD8+ CELLS # SPEC: 168 /UL
CD3+CD8+ CELLS NFR BLD: 14 %
CHLORIDE SERPL-SCNC: 107 MMOL/L
CHOLEST SERPL-MCNC: 141 MG/DL
CO2 SERPL-SCNC: 22 MMOL/L
CREAT SERPL-MCNC: 0.57 MG/DL
EGFR: 118 ML/MIN/1.73M2
EOSINOPHIL # BLD AUTO: 0.16 K/UL
EOSINOPHIL NFR BLD AUTO: 2.2 %
ESTIMATED AVERAGE GLUCOSE: 100 MG/DL
GGT SERPL-CCNC: 109 U/L
GLUCOSE SERPL-MCNC: 95 MG/DL
HBA1C MFR BLD HPLC: 5.1 %
HBV CORE IGG+IGM SER QL: NONREACTIVE
HBV SURFACE AB SER QL: REACTIVE
HBV SURFACE AG SER QL: NONREACTIVE
HCT VFR BLD CALC: 32.6 %
HCV AB SER QL: NONREACTIVE
HCV S/CO RATIO: 0.12 S/CO
HDLC SERPL-MCNC: 15 MG/DL
HEPATITIS A IGG ANTIBODY: NONREACTIVE
HEPB DNA PCR INT: NOT DETECTED
HEPB DNA PCR LOG: NOT DETECTED LOGIU/ML
HGB BLD-MCNC: 10.5 G/DL
HIV1 RNA # SERPL NAA+PROBE: NORMAL
HIV1 RNA # SERPL NAA+PROBE: NORMAL COPIES/ML
IMM GRANULOCYTES NFR BLD AUTO: 0.3 %
INR PPP: 1.89 RATIO
LDLC SERPL CALC-MCNC: 105 MG/DL
LYMPHOCYTES # BLD AUTO: 1.46 K/UL
LYMPHOCYTES NFR BLD AUTO: 20.4 %
MAN DIFF?: NORMAL
MCHC RBC-ENTMCNC: 28.1 PG
MCHC RBC-ENTMCNC: 32.2 GM/DL
MCV RBC AUTO: 87.2 FL
MONOCYTES # BLD AUTO: 0.98 K/UL
MONOCYTES NFR BLD AUTO: 13.7 %
NEUTROPHILS # BLD AUTO: 4.47 K/UL
NEUTROPHILS NFR BLD AUTO: 62.7 %
NONHDLC SERPL-MCNC: 127 MG/DL
PLATELET # BLD AUTO: 219 K/UL
POTASSIUM SERPL-SCNC: 4.2 MMOL/L
PROT SERPL-MCNC: 8.1 G/DL
PT BLD: 22.3 SEC
RBC # BLD: 3.74 M/UL
RBC # FLD: 21 %
SODIUM SERPL-SCNC: 137 MMOL/L
TRIGL SERPL-MCNC: 106 MG/DL
VIRAL LOAD INTERP: NORMAL
VIRAL LOAD LOG: NORMAL LG COP/ML
WBC # FLD AUTO: 7.14 K/UL

## 2022-10-14 RX ORDER — UBIDECARENONE/VIT E ACET 100MG-5
50 MCG CAPSULE ORAL
Qty: 90 | Refills: 2 | Status: ACTIVE | COMMUNITY
Start: 2022-10-14 | End: 1900-01-01

## 2022-10-17 ENCOUNTER — NON-APPOINTMENT (OUTPATIENT)
Age: 42
End: 2022-10-17

## 2022-10-17 LAB
APPEARANCE: ABNORMAL
BILIRUBIN URINE: ABNORMAL
BLOOD URINE: NEGATIVE
COLOR: ABNORMAL
GLUCOSE QUALITATIVE U: NEGATIVE
HCV RNA SERPL NAA+PROBE-LOG IU: NOT DETECTED LOGIU/ML
HEPC RNA INTERP: NOT DETECTED
KETONES URINE: NEGATIVE
LEUKOCYTE ESTERASE URINE: NEGATIVE
NITRITE URINE: NEGATIVE
PH URINE: 6
PROTEIN URINE: ABNORMAL
SPECIFIC GRAVITY URINE: 1.03
UROBILINOGEN URINE: ABNORMAL

## 2022-10-18 ENCOUNTER — NON-APPOINTMENT (OUTPATIENT)
Age: 42
End: 2022-10-18

## 2022-10-23 ENCOUNTER — FORM ENCOUNTER (OUTPATIENT)
Age: 42
End: 2022-10-23

## 2022-10-24 ENCOUNTER — NON-APPOINTMENT (OUTPATIENT)
Age: 42
End: 2022-10-24

## 2022-10-27 ENCOUNTER — NON-APPOINTMENT (OUTPATIENT)
Age: 42
End: 2022-10-27

## 2022-11-02 ENCOUNTER — FORM ENCOUNTER (OUTPATIENT)
Age: 42
End: 2022-11-02

## 2022-11-03 ENCOUNTER — APPOINTMENT (OUTPATIENT)
Dept: INFECTIOUS DISEASE | Facility: CLINIC | Age: 42
End: 2022-11-03

## 2022-11-16 ENCOUNTER — NON-APPOINTMENT (OUTPATIENT)
Age: 42
End: 2022-11-16

## 2022-11-17 ENCOUNTER — APPOINTMENT (OUTPATIENT)
Dept: INFECTIOUS DISEASE | Facility: CLINIC | Age: 42
End: 2022-11-17

## 2022-11-17 ENCOUNTER — NON-APPOINTMENT (OUTPATIENT)
Age: 42
End: 2022-11-17

## 2022-11-17 VITALS
BODY MASS INDEX: 24.75 KG/M2 | OXYGEN SATURATION: 98 % | HEART RATE: 77 BPM | TEMPERATURE: 97.9 F | DIASTOLIC BLOOD PRESSURE: 83 MMHG | SYSTOLIC BLOOD PRESSURE: 132 MMHG | HEIGHT: 66 IN | WEIGHT: 154 LBS | RESPIRATION RATE: 16 BRPM

## 2022-11-17 DIAGNOSIS — E55.9 VITAMIN D DEFICIENCY, UNSPECIFIED: ICD-10-CM

## 2022-11-17 DIAGNOSIS — R17 UNSPECIFIED JAUNDICE: ICD-10-CM

## 2022-11-17 DIAGNOSIS — H61.23 IMPACTED CERUMEN, BILATERAL: ICD-10-CM

## 2022-11-17 DIAGNOSIS — Z23 ENCOUNTER FOR IMMUNIZATION: ICD-10-CM

## 2022-11-17 PROCEDURE — 90471 IMMUNIZATION ADMIN: CPT

## 2022-11-17 PROCEDURE — 99215 OFFICE O/P EST HI 40 MIN: CPT | Mod: 25

## 2022-11-17 PROCEDURE — 90632 HEPA VACCINE ADULT IM: CPT

## 2022-11-17 PROCEDURE — 90834 PSYTX W PT 45 MINUTES: CPT

## 2022-11-17 RX ORDER — FAMOTIDINE 40 MG/1
40 TABLET, FILM COATED ORAL
Qty: 30 | Refills: 2 | Status: COMPLETED | COMMUNITY
Start: 2022-10-13 | End: 2022-11-17

## 2022-11-17 RX ORDER — CEFDINIR 300 MG/1
300 CAPSULE ORAL
Qty: 20 | Refills: 0 | Status: COMPLETED | COMMUNITY
Start: 2022-09-28

## 2022-11-17 RX ORDER — SUCRALFATE 1 G/1
1 TABLET ORAL
Qty: 56 | Refills: 0 | Status: COMPLETED | COMMUNITY
Start: 2022-09-28

## 2022-11-17 RX ORDER — CHOLECALCIFEROL (VITAMIN D3) 50 MCG
50 MCG TABLET ORAL
Qty: 30 | Refills: 0 | Status: COMPLETED | COMMUNITY
Start: 2022-10-14

## 2022-11-17 RX ORDER — QUETIAPINE FUMARATE 25 MG/1
25 TABLET ORAL
Qty: 60 | Refills: 3 | Status: DISCONTINUED | COMMUNITY
Start: 2022-10-13 | End: 2022-11-17

## 2022-11-17 RX ORDER — ERGOCALCIFEROL 1.25 MG/1
1.25 MG CAPSULE, LIQUID FILLED ORAL
Qty: 24 | Refills: 1 | Status: COMPLETED | COMMUNITY
Start: 2022-03-17 | End: 2022-11-17

## 2022-11-17 RX ORDER — HYDROCORTISONE 25 MG/G
2.5 CREAM TOPICAL
Qty: 30 | Refills: 0 | Status: COMPLETED | COMMUNITY
Start: 2022-10-05

## 2022-11-17 RX ORDER — DIPHENHYDRAMINE HCL 25 MG/1
25 CAPSULE ORAL
Qty: 20 | Refills: 0 | Status: COMPLETED | COMMUNITY
Start: 2022-10-05

## 2022-11-17 NOTE — HISTORY OF PRESENT ILLNESS
[FreeTextEntry1] : Pt is a 41 yr old woman with a hx of HIV diagnosed in February 2022, here today with complaints of feeling overwhelmed and having severe insomnia. She says that she is unable to sleep because she is thinking of all the issues that worry her. She is now living with her 7 yr old son since her 20 yr old daughter moved out. She had been overwhelmed as her daughter had been leaving her baby daughter with pt. She says that she had been seeing a psychiatrist who was treating her with Risperidone but she stopped seeing the psychiatrist and stopped taking Risperidone which she feels was not helpful. She agrees that Seroquel may be helpful since this may help her sleep. She says that her mother suffers from mental health problems and had been on Seroquel which helped her. She denies having any past history of any suicidality and denies any history of paranoia or other symptoms of psychosis. She has no hx of substance use. She had been working in sales but currently unemployed. She admits that she had felt depressed and anxious but is able to enjoy relaxing at home watching TV (Law and Order and other shows). She denies having any past history of psychiatric hospitalizations. She is interested in returning to the clinic for follow up.

## 2022-11-17 NOTE — DISCUSSION/SUMMARY
[FreeTextEntry1] : Pt is a 41 yr old woman with a hx of HIV diagnosed in February 2022, here today with complaints of feeling overwhelmed and having severe insomnia. She says that she is unable to sleep because she is thinking of all the issues that worry her. She is now living with her 7 yr old son since her 20 yr old daughter moved out. She had been overwhelmed as her daughter had been leaving her baby daughter with pt. She says that she had been seeing a psychiatrist who was treating her with Risperidone but she stopped seeing the psychiatrist and stopped taking Risperidone which she feels was not helpful. She agrees that Seroquel may be helpful since this may help her sleep. She says that her mother suffers from mental health problems and had been on Seroquel which helped her. She denies having any past history of any suicidality and denies any history of paranoia or other symptoms of psychosis. She has no hx of substance use. She had been working in sales but currently unemployed.\par  She is sleeping a little better with the Seroquel 25mg up to two tablets and will now take the 50mg tablet up to twice daily in the evening and at bedtime.

## 2022-11-17 NOTE — HISTORY OF PRESENT ILLNESS
Physician Pre-Sedation Assessment    Pre-Sedation Assessment:    Sedation History: Previous Sedation with No Complications and Airway Assessed    Cardiac: normal S1, S2  Respiratory: breath sounds clear bilaterally   Abdomen: soft, BS (+), non-tender    AS [FreeTextEntry1] : 42 yo female here for HIV f/u consult and results discussion \par taking Odefsey daily with no missed doses or SE\par \par she is having tingling pain in bilat fingers.  \par started a few weeks ago and is stable\par worse at night\par she takes Aleve with minimal result. \par \par she did not f/u with GI since last consult \par has appt 12/9/2022 with GI.  \par \par Sexual hx ; pt not currently sexually active.  \par \par \par From previous consult 10/13/2022 : \par 42 yo female here for HIV f/u consult and hospital discharge f/u \par taking Odefsey daily with no missed doses or SE \par \par she had episode of LOC last month \par she went to [place] ER\par was admitted to ICU\par given 4 units PRBC\par noted with low sodium as well \par given IVF and IV abx. \par unsure of cause of symptoms or dx\par was discharged 7 days later\par has f/u with GI in 2 weeks\par currently improved with symptoms \par maintains abdominal distension. \par \par \par From previous consult 4/18/2022 : \par 42 yo female here for HIV f/u consult and results discussion\par taking Odefsey daily with one missed doses \par denies any SE from medication\par \par \par From previous consult 3/17/2022 : \par 42 yo female here for HIV initial consult/ transfer of care. \par she takes Odefsey daily but ran out 3 days ago \par she has intermittent nausea and vomiting after taking medications but states Odefsey helps her. \par \par PMH : Dx 2/15/2015 via unprotected sexual relations. denies any other medication use besides Odefsey. \par denies any recent travel, hospitalizations. denies any blood transfusions. \par PCP : Mercy Acuña -[phone]\par Vaccines : denies COVID or Influenza vaccines\par Screenings : Pap and mammo appt next week. \par PSH : left knee sx 10/15/2021- due to trauma. \par PFH : Mother - HTN, DM, Father- HTN, DM\par Social hx : denies any IVDU, smoking, ETOH. Confirms tattoos\par Sexual hx : Single. Heterosexual. Last sexual encounter one year ago. Confirms condom use. \par Partner : none currently \par Occupation : Unemployed\par Lives with : 2 children \par Who is aware of HIV status : friend\par \par NKDA\par \par \par 11/17/2022 Plan\par HIV \par All test results from previous consult reviewed with patient.  \par 1. continue current treatment \par 2. f/u 3 months \par 3. Hepatitis A #1 vaccine given today \par \par Jaundice\par noted with improving jaundice.  Pt has appt with GI 12/9/2022. \par 1. f/u with GI as scheduled for further evaluation. \par \par Proteinuria. \par Pt decreased excessive protein intake.  \par 1. continue current treatment for now. \par \par Low Vit D \par 1. continue current treatment \par \par Cerumen Impaction\par pt purchased Debrox OTC but has not used it. \par 1. perform ear cleansing as per package instructions.

## 2022-11-17 NOTE — ASSESSMENT
[Treatment Education] : treatment education [Treatment Adherence] : treatment adherence [Medical Care Issues] : medical care issues [HIV Education] : HIV Education [FreeTextEntry1] : HIV f/u consult.  HIV stable.

## 2022-11-17 NOTE — REVIEW OF SYSTEMS
[Difficulty Sleeping] : difficulty sleeping [As Noted in HPI] : as noted in HPI [Limb Pain] : limb pain [Recent Weight Gain (___ Lbs)] : recent [unfilled] ~Ulb weight gain

## 2022-11-17 NOTE — PHYSICAL EXAM
[General Appearance - Alert] : alert [General Appearance - In No Acute Distress] : in no acute distress [General Appearance - Well Nourished] : well nourished [PERRL With Normal Accommodation] : pupils were equal in size, round, reactive to light [Extraocular Movements] : extraocular movements were intact [Yellow Sclera (Icteric)] : the sclera were icteric [Outer Ear] : the ears and nose were normal in appearance [Hearing Threshold Finger Rub Not [place]] : hearing was normal [Completely Obscured] : completely [Cerumen in Canal] : by cerumen [Neck Appearance] : the appearance of the neck was normal [Neck Cervical Mass (___cm)] : no neck mass was observed [Jugular Venous Distention Increased] : there was no jugular-venous distention [Respiration, Rhythm And Depth] : normal respiratory rhythm and effort [Exaggerated Use Of Accessory Muscles For Inspiration] : no accessory muscle use [Auscultation Breath Sounds / Voice Sounds] : lungs were clear to auscultation bilaterally [Heart Rate And Rhythm] : heart rate was normal and rhythm regular [Heart Sounds] : normal S1 and S2 [Heart Sounds Gallop] : no gallops [Murmurs] : no murmurs [Heart Sounds Pericardial Friction Rub] : no pericardial rub [Edema] : there was no peripheral edema [Bowel Sounds] : normal bowel sounds [Abdomen Soft] : soft [Abdomen Tenderness] : non-tender [Abdomen Mass (___ Cm)] : no abdominal mass palpated [Costovertebral Angle Tenderness] : no CVA tenderness [Liver Enlargement] : was enlarged [No Palpable Adenopathy] : no palpable adenopathy [Musculoskeletal - Swelling] : no joint swelling [Range of Motion to Joints] : range of motion to joints [Nail Clubbing] : no clubbing  or cyanosis of the fingernails [Motor Tone] : muscle strength and tone were normal [] : no rash [Skin Lesions] : no skin lesions [Cranial Nerves] : cranial nerves 2-12 were intact [Sensation] : the sensory exam was normal to light touch and pinprick [Motor Exam] : the motor exam was normal [No Focal Deficits] : no focal deficits [Oriented To Time, Place, And Person] : oriented to person, place, and time [Affect] : the affect was normal [FreeTextEntry1] : jaundice

## 2022-11-17 NOTE — PHYSICAL EXAM
[None] : none [Cooperative] : cooperative [Depressed] : depressed [Clear] : clear [Linear/Goal Directed] : linear/goal directed [WNL] : within normal limits

## 2022-11-18 ENCOUNTER — NON-APPOINTMENT (OUTPATIENT)
Age: 42
End: 2022-11-18

## 2022-11-28 ENCOUNTER — NON-APPOINTMENT (OUTPATIENT)
Age: 42
End: 2022-11-28

## 2022-11-29 ENCOUNTER — APPOINTMENT (OUTPATIENT)
Dept: INFECTIOUS DISEASE | Facility: CLINIC | Age: 42
End: 2022-11-29

## 2022-11-29 ENCOUNTER — NON-APPOINTMENT (OUTPATIENT)
Age: 42
End: 2022-11-29

## 2022-11-30 ENCOUNTER — APPOINTMENT (OUTPATIENT)
Dept: UROLOGY | Facility: CLINIC | Age: 42
End: 2022-11-30

## 2022-11-30 DIAGNOSIS — R80.9 PROTEINURIA, UNSPECIFIED: ICD-10-CM

## 2022-11-30 DIAGNOSIS — B20 HUMAN IMMUNODEFICIENCY VIRUS [HIV] DISEASE: ICD-10-CM

## 2022-11-30 PROCEDURE — 99204 OFFICE O/P NEW MOD 45 MIN: CPT

## 2022-11-30 NOTE — PHYSICAL EXAM
[General Appearance - Well Developed] : well developed [General Appearance - Well Nourished] : well nourished [Normal Appearance] : normal appearance [Well Groomed] : well groomed [General Appearance - In No Acute Distress] : no acute distress [Edema] : no peripheral edema [Respiration, Rhythm And Depth] : normal respiratory rhythm and effort [Exaggerated Use Of Accessory Muscles For Inspiration] : no accessory muscle use [Abdomen Soft] : soft [Abdomen Tenderness] : non-tender [Costovertebral Angle Tenderness] : no ~M costovertebral angle tenderness [FreeTextEntry1] : pvr- zero [Normal Station and Gait] : the gait and station were normal for the patient's age [] : no rash [No Focal Deficits] : no focal deficits [Oriented To Time, Place, And Person] : oriented to person, place, and time [Affect] : the affect was normal [Mood] : the mood was normal [Not Anxious] : not anxious [No Palpable Adenopathy] : no palpable adenopathy

## 2022-11-30 NOTE — ASSESSMENT
[FreeTextEntry1] : patient with hx of HIV \par liver abnl \par recent admission to [facility] for anemia - states she had transfusion \par no LUTS\par no bowel issues\par no menses abnl \par  ( c secton )\par no tobacco use or drug use, occas EtOH use \par sexually active \par here for eval of proteinuria :\par 1- discussed urine with low protein in settng of liver abnl \par 2- repeat urine \par 3- check 24 hour urine for protein \par 4- if abnl- needs NEPHROLOGY eval

## 2022-11-30 NOTE — HISTORY OF PRESENT ILLNESS
[FreeTextEntry1] : patient with hx of HIV \par liver abnl \par recent admission to [facility] for anemia - states she had transfusion \par no LUTS\par no bowel issues\par no menses abnl \par  ( c secton )\par no tobacco use or drug use, occas EtOH use \par sexually active \par here for eval of proteinuria :

## 2022-12-02 LAB
APPEARANCE: ABNORMAL
BACTERIA UR CULT: NORMAL
BACTERIA: NEGATIVE
BILIRUBIN URINE: NEGATIVE
BLOOD URINE: NEGATIVE
COLOR: YELLOW
GLUCOSE QUALITATIVE U: NEGATIVE
HYALINE CASTS: 0 /LPF
KETONES URINE: NEGATIVE
LEUKOCYTE ESTERASE URINE: NEGATIVE
MICROSCOPIC-UA: NORMAL
NITRITE URINE: NEGATIVE
PH URINE: 6.5
PROTEIN URINE: NORMAL
RED BLOOD CELLS URINE: 3 /HPF
SPECIFIC GRAVITY URINE: 1.02
SQUAMOUS EPITHELIAL CELLS: 10 /HPF
URINE COMMENTS: NORMAL
UROBILINOGEN URINE: NORMAL
WHITE BLOOD CELLS URINE: 5 /HPF

## 2022-12-06 ENCOUNTER — NON-APPOINTMENT (OUTPATIENT)
Age: 42
End: 2022-12-06

## 2022-12-07 ENCOUNTER — NON-APPOINTMENT (OUTPATIENT)
Age: 42
End: 2022-12-07

## 2022-12-08 ENCOUNTER — NON-APPOINTMENT (OUTPATIENT)
Age: 42
End: 2022-12-08

## 2022-12-12 ENCOUNTER — NON-APPOINTMENT (OUTPATIENT)
Age: 42
End: 2022-12-12

## 2022-12-15 ENCOUNTER — APPOINTMENT (OUTPATIENT)
Dept: INFECTIOUS DISEASE | Facility: CLINIC | Age: 42
End: 2022-12-15

## 2022-12-15 ENCOUNTER — NON-APPOINTMENT (OUTPATIENT)
Age: 42
End: 2022-12-15

## 2022-12-15 PROCEDURE — 99213 OFFICE O/P EST LOW 20 MIN: CPT

## 2022-12-15 NOTE — HISTORY OF PRESENT ILLNESS
[Not Applicable] : Not applicable [FreeTextEntry1] : Pt is a 41 yr old woman with a hx of HIV diagnosed in February 2022, here today with complaints of feeling overwhelmed and having severe insomnia. She says that she is unable to sleep because she is thinking of all the issues that worry her. She is now living with her 7 yr old son since her 20 yr old daughter moved out. She had been overwhelmed as her daughter had been leaving her baby daughter with pt. She says that she had been seeing a psychiatrist who was treating her with Risperidone but she stopped seeing the psychiatrist and stopped taking Risperidone which she feels was not helpful. She agrees that Seroquel may be helpful since this may help her sleep. She says that her mother suffers from mental health problems and had been on Seroquel which helped her. She denies having any past history of any suicidality and denies any history of paranoia or other symptoms of psychosis. She has no hx of substance use. 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 She admits that she had felt depressed and anxious but is able to enjoy relaxing at home watching TV (Law and Order and other shows). She denies having any past history of psychiatric hospitalizations. She is interested in returning to the clinic for follow up. \par

## 2022-12-15 NOTE — DISCUSSION/SUMMARY
[FreeTextEntry1] : Pt is a 41 yr old woman with a hx of HIV diagnosed in February 2022, here today with complaints of feeling overwhelmed and having severe insomnia. She says that she is unable to sleep because she is thinking of all the issues that worry her. She is now living with her 7 yr old son since her 20 yr old daughter moved out. She had been overwhelmed as her daughter had been leaving her baby daughter with pt. She says that she had been seeing a psychiatrist who was treating her with Risperidone but she stopped seeing the psychiatrist and stopped taking Risperidone which she feels was not helpful. She agrees that Seroquel may be helpful since this may help her sleep. She says that her mother suffers from mental health problems and had been on Seroquel which helped her. She denies having any past history of any suicidality and denies any history of paranoia or other symptoms of psychosis. She has no hx of substance use. She had been working in sales but currently unemployed. She admits that she had felt depressed and anxious but is able to enjoy relaxing at home watching TV ([place] and Order and other shows). She denies having any past history of psychiatric hospitalizations. She is interested in returning to the clinic for follow up. \par

## 2022-12-20 ENCOUNTER — NON-APPOINTMENT (OUTPATIENT)
Age: 42
End: 2022-12-20

## 2022-12-27 ENCOUNTER — NON-APPOINTMENT (OUTPATIENT)
Age: 42
End: 2022-12-27

## 2022-12-29 ENCOUNTER — NON-APPOINTMENT (OUTPATIENT)
Age: 42
End: 2022-12-29

## 2023-01-09 ENCOUNTER — NON-APPOINTMENT (OUTPATIENT)
Age: 43
End: 2023-01-09

## 2023-01-11 ENCOUNTER — NON-APPOINTMENT (OUTPATIENT)
Age: 43
End: 2023-01-11

## 2023-01-12 ENCOUNTER — NON-APPOINTMENT (OUTPATIENT)
Age: 43
End: 2023-01-12

## 2023-01-12 ENCOUNTER — APPOINTMENT (OUTPATIENT)
Dept: INFECTIOUS DISEASE | Facility: CLINIC | Age: 43
End: 2023-01-12
Payer: MEDICAID

## 2023-01-12 PROCEDURE — 99213 OFFICE O/P EST LOW 20 MIN: CPT

## 2023-01-12 RX ORDER — GABAPENTIN 300 MG/1
300 CAPSULE ORAL
Qty: 60 | Refills: 3 | Status: ACTIVE | COMMUNITY
Start: 2022-12-15 | End: 1900-01-01

## 2023-01-18 NOTE — HISTORY OF PRESENT ILLNESS
[FreeTextEntry1] : Pt says that she is busy caring for her 10 month of granddaughter and is sleeping better with the medications she takes.  She continues Trazodone 50mg in addition to the Seroquel 50mg she takes at night. She was also having much neuropathic pain and tingling in her hands and feet and would llike to increase the Neurontin at bedtime.  [FreeTextEntry2] : Pt is a 41 yr old woman with a hx of HIV diagnosed in February 2022, initially seeking help because of her insomnia and anxiety especially at night. . She is now living with her 7 yr old son since her 20 yr old daughter moved out. She had been overwhelmed as her daughter had been leaving her baby daughter with pt. She says that she had been seeing a psychiatrist who was treating her with Risperidone but she stopped seeing the psychiatrist and stopped taking Risperidone which she feels was not helpful. She agrees that Seroquel may be helpful since this may help her sleep. She says that her mother suffers from mental health problems and had been on Seroquel which helped her. She denies having any past history of any suicidality and denies any history of paranoia or other symptoms of psychosis. She has no hx of substance use. 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 She admits that she had felt depressed and anxious but is able to enjoy relaxing at home watching TV ([place] and Order and other shows). She denies having any past history of psychiatric hospitalizations. She is feeling better and sleeping better on the combination of low dose Seroquel, Trazodone. and Neurontin for the neuropathic pain and anxiety.

## 2023-01-18 NOTE — DISCUSSION/SUMMARY
[FreeTextEntry1] : Pt is a 41 yr old woman with a hx of HIV diagnosed in February 2022, initially seeking help because of her insomnia and anxiety especially at night. . She is now living with her 7 yr old son since her 20 yr old daughter moved out. She had been overwhelmed as her daughter had been leaving her baby daughter with pt. She says that she had been seeing a psychiatrist who was treating her with Risperidone but she stopped seeing the psychiatrist and stopped taking Risperidone which she feels was not helpful. She agrees that Seroquel may be helpful since this may help her sleep. She says that her mother suffers from mental health problems and had been on Seroquel which helped her. She denies having any past history of any suicidality and denies any history of paranoia or other symptoms of psychosis. She has no hx of substance use. 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 She admits that she had felt depressed and anxious but is able to enjoy relaxing at home watching TV ([place] and Order and other shows). She denies having any past history of psychiatric hospitalizations. She is feeling better and sleeping better on the combination of low dose Seroquel, Trazodone. and Neurontin for the neuropathic pain and anxiety.

## 2023-01-18 NOTE — RISK ASSESSMENT
[No, patient denies ideation or behavior] : No, patient denies ideation or behavior [No] : No [No, Reason: ____] : Safety Plan completed/updated (for individuals at risk): No, Reason: [unfilled]

## 2023-01-24 ENCOUNTER — EMERGENCY (EMERGENCY)
Facility: HOSPITAL | Age: 43
LOS: 1 days | Discharge: LEFT BEFORE DISCHARGE | End: 2023-01-24
Admitting: EMERGENCY MEDICINE
Payer: SELF-PAY

## 2023-01-24 VITALS
HEART RATE: 96 BPM | RESPIRATION RATE: 16 BRPM | SYSTOLIC BLOOD PRESSURE: 145 MMHG | DIASTOLIC BLOOD PRESSURE: 95 MMHG | OXYGEN SATURATION: 100 % | TEMPERATURE: 98 F

## 2023-01-24 DIAGNOSIS — F43.25 ADJUSTMENT DISORDER WITH MIXED DISTURBANCE OF EMOTIONS AND CONDUCT: ICD-10-CM

## 2023-01-24 PROCEDURE — 99285 EMERGENCY DEPT VISIT HI MDM: CPT

## 2023-01-24 PROCEDURE — 90792 PSYCH DIAG EVAL W/MED SRVCS: CPT | Mod: GC

## 2023-01-24 NOTE — ED BEHAVIORAL HEALTH ASSESSMENT NOTE - RISK ASSESSMENT
Risk factors: impulsivity, not in treatment, documented hx of schizoaffective disorder, documented hx of alcohol abuse  Protective factors: Supportive family, stable housing, future oriented, identifies reasons for living. Risk factors: impulsivity, not in treatment, documented hx of schizoaffective disorder, documented hx of alcohol abuse  Protective factors: Supportive family, stable housing, future oriented, identifies reasons for living.    at this time, the Pt is NOT at acute suicide risk and is also NOT at chronically elevated risk of self-harm.  currently, there are no identifiable acute increase in risk(s) that would be mitigated by an involuntary psychiatric admission.  Pt was offered voluntary admission to in-patient unit but refused.  at this time, the Pt remains appropriate for an out-Pt level of care. Modifiable risk factors will be addressed to once Pt gets a stable out-Pt psych treatment.

## 2023-01-24 NOTE — ED PROVIDER NOTE - OBJECTIVE STATEMENT
This is a 42-year-old female past medical history schizoaffective disorder with complaint of acting behavior agitation anxiety. She arrived from [facility] via EMS and PD. Reports she took her 7-year-old son to the hospital who has autism. She was concerned about the his care  and voiced her opinion and got into  verbal altercation with staff members. Reports lives with son, she has a 20 year old daughter who lives in a different household with her own child. Denies si, hi, ah, vh, raj, psychosis.

## 2023-01-24 NOTE — ED ADULT NURSE NOTE - CHIEF COMPLAINT QUOTE
Pt ambulatory from [facility]...arrives in cuffs with 115 Police sent from [facility] after altercation with staff. Pt st" I just wanted to see my child." Denies psych hx  Pt denies alcholol drug use. As per Muriel NP at [facility]  " Her son is here being treated he is on stimulants and his prescipt was almost empty concern she may be using the stimulant....unprovoked  she became verbally aggressive , threatening to punch staff in face, very loud screaming making racial slurres toward staff, her speech is pressured and rapid appears paranoid and alittle> " disorganized.

## 2023-01-24 NOTE — ED PROVIDER NOTE - CLINICAL SUMMARY MEDICAL DECISION MAKING FREE TEXT BOX
This is a 42-year-old female past medical history schizoaffective disorder with complaint of acting behavior agitation anxiety. She arrived from [facility] via EMS and PD. Reports she took her 7-year-old son to the hospital who has autism. She was concerned about the his care  and voiced her opinion and got into  verbal altercation with staff members. Reports lives with son, she has a 20 year old daughter who lives in a different household with her own child. Denies si, hi, ah, vh, raj, psychosis.  Psych consult requested- recommendation out patient follow up.  There is no clinical evidence of intoxication, or any acute medical problem requiring immediate intervention. At present time pat in not a harm to self or others and can be safely discharge to follow up with psychiatrist.

## 2023-01-24 NOTE — ED BEHAVIORAL HEALTH ASSESSMENT NOTE - NSPRESENTSXS_PSY_ALL_CORE
This is a 89M w/ a remote (>10yrs) history of cardiac stents, not on any blood thinners, no other medical problems. Patient arrives to [facility] ED with acute onset of word finding difficulties and not recognizing family members.   -STAT head CT done in ED showed:  < from: CT Brain Stroke Protocol (12.26.18 @ 00:13) >  IMPRESSION:  No acute intracranial hemorrhage. Acute/subacute left temporal lobe   infarct. Additional age-indeterminate and chronic findings as above.    I discussed the findings with Dr. Dominguez at 12:20 AM on 12/26/2018 with   read back verification.    < end of copied text >      -patient was then evalauted by telestroke neurologist who deemed patient candidate for tPA. However patient was noted to be hypertensive with SBP >190 (despite no PMH of HTN) he was given IVP labetalol and started on cardene drip by ER staff prior to recieiving tPA  -tPA given at 0100 on 12/26/18    -Myself and telestroke MD have explained adina risks and benefits of tPA including increased risk of intracranial bleeding Impulsivity

## 2023-01-24 NOTE — ED BEHAVIORAL HEALTH NOTE - BEHAVIORAL HEALTH NOTE
POLINA called pt’s Stacei woods, at [phone] for collateral.  He reports that she’s been fine, no issues at home.  He denies any substance/alcohol/drug/nicotine use.  He also denies any mental health issues or history of psychiatric care.

## 2023-01-24 NOTE — ED BEHAVIORAL HEALTH ASSESSMENT NOTE - SUMMARY
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, also has a 20 year old daughter, engaged, domiciled with son, currently on disability, pt denies PPHx, but per Psyckes has listed diagnoses of Schizoaffective disorder, alcohol related disorder, schizophrenia, delusional disorder, per pt no prior hospitalizations and none documented per psyckes review, no hx SA or NSSIB, no legal hx or known hx of aggression, pt denies hx of substance abuse though psyckes mentions hx of AUD, who is being evaluated psychiatrically after an episode of agitation when pt presented to [facility] ED seeking psychiatric evaluation for her son, pt was placed under arrest and transferred to [facility] ED for psych eval.    At this time, pt is presenting as cooperative, linear, no evidence of formal thought disorder, no evidence of raj. Pt admits that she has been feeling a significant amount of stress in the context of her son's recent and increasing behavioral issues given his diagnosis of ADHD and autism and that she became emotionally dysregulated due to this ongoing stress. Pt at this time does not meet criteria for involuntary admission given she does not currently pose a threat to herself or others. Though pt is not forthcoming about her psychiatric history, psyckes report does not show evidence of any recent hospitalizations or other documentation that would cause elevated safety concerns should pt be discharged. At this time, pt is not interested in a voluntary admission, pt did accept resources for local community resources. 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  subsequently transferred to [facility] ED for psych eval due to escalating agitation    At this time, pt is presenting as cooperative, linear, no evidence of formal thought disorder, no evidence of raj. Pt admits that she has been feeling a significant amount of stress in the context of her son's recent and increasing behavioral issues given his diagnosis of ADHD and autism and that she became emotionally dysregulated due to this ongoing stress. Pt at this time does not meet criteria for involuntary admission given she does not currently pose a threat to herself or others. Though pt is not forthcoming about her psychiatric history, psyckes report does not show evidence of any recent hospitalizations or other documentation that would cause elevated safety concerns should pt be discharged. At this time, pt is not interested in a voluntary admission, pt did accept resources for local community resources.

## 2023-01-24 NOTE — ED BEHAVIORAL HEALTH ASSESSMENT NOTE - CURRENT MEDICATION
Pt denies current medication, but per psyckes review, pt is on:  Gabapentin 300mg daily, Seroquel 50mg hs, Trazodone 50mg hs

## 2023-01-24 NOTE — ED BEHAVIORAL HEALTH ASSESSMENT NOTE - DESCRIPTION
Has one son age 7, 1 daughter age 20 as well as a granddaughter age 1, has a fiance, on disabiliy currently Hypertension, HIV Pt presented [facility] seeking psych eval for her son, became agitated at [facility], was placed under arrest and escorted over by police to [facility] adult ED. On arrival to , pt in better behavioral control, cooperative with interview    Vital Signs Last 24 Hrs  T(C): 36.9 (24 Jan 2023 20:42), Max: 36.9 (24 Jan 2023 20:42)  T(F): 98.4 (24 Jan 2023 20:42), Max: 98.4 (24 Jan 2023 20:42)  HR: 96 (24 Jan 2023 20:42) (96 - 96)  BP: 145/95 (24 Jan 2023 20:42) (145/95 - 145/95)  BP(mean): --  RR: 16 (24 Jan 2023 20:42) (16 - 16)  SpO2: 100% (24 Jan 2023 20:42) (100% - 100%)    Parameters below as of 24 Jan 2023 20:42  Patient On (Oxygen Delivery Method): room air

## 2023-01-24 NOTE — ED ADULT TRIAGE NOTE - CHIEF COMPLAINT QUOTE
Pt ambulatory from [facility]...arrives in cuffs with 115 Police sent from [facility] after altercation with staff. Pt st" I just wanted to see my child." Denies psych hx  Pt denies alcholol drug use. As per Muriel NP at [facility]  " Her son is here being treated he is on stimulants and his prescipt was almost empty concern she may be using the stimulant....unprovoked  she became verbally aggressive , threatening to punch staff in face, very loud screaming making racial slurres toward staff, her speech is pressured and rapid appears paranoid and alittle> " disorganized.

## 2023-01-24 NOTE — ED ADULT NURSE NOTE - CAS EDN DISCHARGE ASSESSMENT
Alert and oriented to person, place and time Bilobed Flap Text: The defect edges were debeveled with a #15 scalpel blade.  Given the location of the defect and the proximity to free margins a bilobe flap was deemed most appropriate.  Using a sterile surgical marker, an appropriate bilobe flap drawn around the defect.    The area thus outlined was incised deep to adipose tissue with a #15 scalpel blade.  The skin margins were undermined to an appropriate distance in all directions utilizing iris scissors.

## 2023-01-24 NOTE — ED PROVIDER NOTE - NSCAREINITIATED _GEN_ER
Kylee Weber(NP) pt lives in a house with his wife with children with 5 steps to enter and 5 steps within the home.

## 2023-01-24 NOTE — ED BEHAVIORAL HEALTH ASSESSMENT NOTE - NSBHROSSYSTEMS_PSY_ALL_CORE
Pt currently denied experiencing any headaches; has no dizziness, no blurring of vision; no sorethroat; no cough, no fever. no chest pains, no SOB, no palpitations, no abdominal pains, no nausea/ vomiting/ diarrhea, no dysuria, no hesitancy, no arthralgia/ no pruritus. denied any muscle/ joint pains/Musculoskeletal...

## 2023-01-24 NOTE — ED BEHAVIORAL HEALTH ASSESSMENT NOTE - GENERAL APPEARANCE
No deformities present + multiple tattoos upper extremities. DOES NOT APPEAR TO BE INTERNALLY STIMULATED.  teary eyed when telling her worries/ frustration re: current limited resources for her son whom she claims has ASD + ADHD/No deformities present

## 2023-01-24 NOTE — ED ADULT NURSE NOTE - OBJECTIVE STATEMENT
Pt arrives to  via EMS from [facility] in handcuffs with 115 pct . Pt is 43 y/o F with PMH of HTN presents to  ED for aggressive behavior at [facility]. Pt reports she brought her son to [facility]'s as he is being treated for ADHD and overheard the nurse say something rude to her child. Pt reports she just wanted to see her child, denies getting into a physical altercation with staff at [facility]. Pt denies illicit drug use, SI, and HI. Environment checked for safety. Safety maintained.
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
## 2023-01-24 NOTE — ED BEHAVIORAL HEALTH ASSESSMENT NOTE - NSBHATTESTCOMMENTATTENDFT_PSY_A_CORE
42/F with hx of SAD vs SCZ vs delusional disorder as per Psyckes though Pt denies; no documented (and Pt denied as well) hx of psych admissions as per Psyckes; no hx of SA and denied hx of illicit substance use though per Psyckes, there had been past hx of AUD.  she has no legal hx or known hx of aggression.  Presented to the ED tonight accompanied by security personnel for evaluation of agitation.  initially, Pt and her son came to the [facility] ED seeking psychiatric evaluation for her son whom she claimed, has hx of ASD and ADHD.      At this time, the Pt is not manifesting any signs/ symptoms suggestive of acute raj or florid psychosis. whilst she does express frustration and anxiety over inability to secure psych care for her son - whom she described as a "handful due to his sometimes unpredictable, erratic behavior", she is not showing any signs/ symptoms suggestive of severe MDD; no severe anxiety disorder symptoms.  Pt is not psychomotorically retarded.  currently, she is presenting as cooperative, linear, no evidence of formal thought disorder.      Pt admits experiencing a significant amount of stress in the context of her son's recent and increasing behavioral issues given his diagnosis of ADHD and autism; that she has became progressively emotionally dysregulated due to this ongoing stress.     Pt at this time does not meet criteria for involuntary admission given she does not currently pose a threat to herself or others. Since her [facility]ED arrival, the Pt has been calm and cooperative.  There has been no agitation/aggressive behavior.  No verbalization of active/ passive SI/HI.   she is not showing any signs/symptoms of acute intoxication or any impending withdrawal.  Pt is not delirious.  she has not tested limits.. Has maintained appropriate boundaries. she has been easily redirected.  Overall, there has been no management issues. No PRN meds given to this Pt     Though she was not forthcoming about her past psychiatric history, Psyckes report does not show evidence of any recent hospitalizations (within the last 5 yrs) or other documentation that would cause elevated safety concerns should Pt be discharged. collateral information was obtained from her fiance who reported that overall, the Pt has been doing well. he did not raise any safety concerns at this time.  currently, service does not have any cause to pursue an involuntary psych admission as she does not meet criteria for such.  she was however, offered voluntary admission (for the purpose of initiation of therapy + meds) but she refused.  Pt did accept resources provided to her for pursuit of out-pt psych services.

## 2023-01-24 NOTE — ED BEHAVIORAL HEALTH ASSESSMENT NOTE - NS ED BHA PLAN TR BH CONTACTED YN
[Well Developed] : well developed [Well Nourished] : well nourished [No Acute Distress] : no acute distress [Normal Conjunctiva] : normal conjunctiva [Normal Venous Pressure] : normal venous pressure [No Carotid Bruit] : no carotid bruit [Normal S1, S2] : normal S1, S2 [No Murmur] : no murmur [No Rub] : no rub [5th Left ICS - MCL] : palpated at the 5th LICS in the midclavicular line [Normal] : normal [No Precordial Heave] : no precordial heave was noted [Normal Rate] : normal [Rhythm Regular] : regular [No Gallop] : no gallop heard [II] : a grade 2 Yes [I] : a grade 1 [2+] : left 2+ [Clear Lung Fields] : clear lung fields [Good Air Entry] : good air entry [No Respiratory Distress] : no respiratory distress  [Soft] : abdomen soft [Non Tender] : non-tender [No Masses/organomegaly] : no masses/organomegaly [Normal Bowel Sounds] : normal bowel sounds [Normal Gait] : normal gait [No Edema] : no edema [No Cyanosis] : no cyanosis [No Clubbing] : no clubbing [No Varicosities] : no varicosities [No Rash] : no rash [No Skin Lesions] : no skin lesions [Moves all extremities] : moves all extremities [No Focal Deficits] : no focal deficits [Normal Speech] : normal speech [Alert and Oriented] : alert and oriented [Normal memory] : normal memory [Click] : no click [Pericardial Rub] : no pericardial rub

## 2023-01-24 NOTE — ED BEHAVIORAL HEALTH ASSESSMENT NOTE - REFERRAL / APPOINTMENT DETAILS
provided pt with resources ([facility], [facility], [facility]) provided pt with resources ([facility], [facility], [facility], other mental health clinic within [facility])

## 2023-01-24 NOTE — ED PROVIDER NOTE - PATIENT PORTAL LINK FT
You can access the FollowMyHealth Patient Portal offered by [facility] by registering at the following website: [email or web address]. By joining [employer]’s FollowMyHealth portal, you will also be able to view your health information using other applications (apps) compatible with our system.

## 2023-01-24 NOTE — ED PROVIDER NOTE - PROGRESS NOTE DETAILS
NP Bereczky- Patient remains calm, cooperative, and in good behavioral control throughout ED course. She has not required prn meds. Pt states she feels fine and denies any psychiatric complaints at this time. She is organized and does not appear internally preoccupied or paranoid. She denies suicidal ideation, intent, or plan, denies homicidal or violent ideation, intent, or plan, denies paranoia, no delusional content.

## 2023-01-24 NOTE — ED BEHAVIORAL HEALTH NOTE - BEHAVIORAL HEALTH NOTE
Per [facility]/ [employer] site: NO PENDING LEGAL CASES     Per [facility]  Reference #: [id number]  Patient Name: Preethi Santos YOB: 1980  Address:  [address] [place], [place] [address]Qre: Female  Rx Written	Rx Dispensed	Drug	Quantity	Days Supply	Prescriber Name	Prescriber Trina #	Payment Method	Dispenser  05/10/2022	05/10/2022	oxycodone hcl (ir) 5 mg tablet	10	3	Liam Watson	[id number]	Insurance	[facility]    No Yield on CVM     Per PSYCKES:  PREETHI SANTOS  Medicaid ID # [id number] (eligibility til 2023)   1980   [facility]) Tier 2 HCBS Eligibility (Reassess  Address:  [address],  [place], [place], [address]    with Behavioral Health Diagnoses: of Schizoaffective Disorder, Schizophrenia, and Delusional Disorder  substance:  Alcohol related disorders   no psych admissions within the past 5 yrs     Medical diagnoses: HIV, anemia, hx of covid infection, HTN     Drug Class Drug Name Last Dose* Estimated Duration                            First Day Picked Up     Last day Picked Up  Mood Stabilizer Gabapentin 300 MG, 1/day 1 Month(s) 3 Week(s) 4 Day(s)       12/15/2022            1/10/2023  Antipsychotic Quetiapine Fumarate 50 MG, 1/day 3 Month(s) 3 Week(s) 6 Day(s) 10/13/2022           1/10/2023  Antidepressant Trazodone Hcl 50 MG, 1/day 1 Month(s) 3 Week(s) 4 Day(s)        12/15/2022           1/10/2023     OTHER MEDS:  HMG CoA Reductase Inhibitors Atorvastatin Calcium 10 MG, 1/day 3 Month(s) 3 Week(s) 6 Day(s)  10/13/2022 1/10/2023  Corticosteroids - Topical Hydrocortisone (Hydrocortisone Max St) 1 %, 2.03/day 3 Month(s) 1 Week(s) 4 Day(s) 10/13/2022 1/10/2023  Alpha-Beta Blockers Labetalol Hcl 300 MG, 2/day 10 Month(s) 3 Week(s) 2 Day(s) 3/17/2022 1/10/2023  Anti-infectives - Throat Nystatin 067110 UNIT/ML, 20/day 10 Month(s) 3 Week(s) 2 Day(s) 3/17/2022 1/10/2023  Oil Soluble Vitamins Cholecalciferol (Vitamin D) 50 MCG ( UT)/ day 3 Month(s) 3 Week(s) 5 Day(s)  10/14/2022 1/10/2023    CURRENTLY NOT IN PSYCH CARE  LAST treatment received from [facility] on 2020 1 Schizophrenia, unspecified  Offce O/P Est Low 20-29 Min

## 2023-01-24 NOTE — ED BEHAVIORAL HEALTH ASSESSMENT NOTE - DETAILS
as above denies hx no safety concerns lives with son ACS contacted today after pt became agitated in [facility] ED. Case ID: [id number].  Ms Chauncey POLLOCK bilateral wrist pain - from the initially hand cuffing done prior to the  ED visit

## 2023-01-24 NOTE — ED PROVIDER NOTE - NSFOLLOWUPINSTRUCTIONS_ED_ALL_ED_FT
Follow up with your primary care physician and psychiatrist in 48-72 hours.  You may also see the psychiatrist at [facility]:    [address] [place] [address]  Phone: [phone]    [facility] Information:    -Walk-in hours: Monday to Friday, 9am to 3pm   -Almost all walk-in patients will be able to see a psych prescriber the same day   -Scheduled, non-urgent, evening remote/virtual appointments are available on a limited basis. Call our  to inquire about these: [phone]. A crisis center clinician screens these requests in the late afternoon and if appropriate it takes a few days to set-up.   -Visits take about 2 to 4 hours total   -Mornings are the best time for patients to arrive    For Telehealth options try:  [employer]: [email or web address] (to access psychiatrist or therapist)  [employer]: [employer] (to access psychiatrist or therapist)  [facility]: [email or web address] (Largest online therapy group)      SEEK IMMEDIATE MEDICAL CARE IF YOU HAVE ANY OF THE FOLLOWING SYMPTOMS: thoughts about hurting or killing yourself, thoughts about hurting or killing somebody else, hallucinations or any other worsening or persistent symptoms OR ANY NEW OR CONCERNING SYMPTOMS.

## 2023-01-25 ENCOUNTER — NON-APPOINTMENT (OUTPATIENT)
Age: 43
End: 2023-01-25

## 2023-01-30 ENCOUNTER — NON-APPOINTMENT (OUTPATIENT)
Age: 43
End: 2023-01-30

## 2023-02-06 ENCOUNTER — NON-APPOINTMENT (OUTPATIENT)
Age: 43
End: 2023-02-06

## 2023-02-07 ENCOUNTER — APPOINTMENT (OUTPATIENT)
Dept: INFECTIOUS DISEASE | Facility: CLINIC | Age: 43
End: 2023-02-07

## 2023-02-15 ENCOUNTER — NON-APPOINTMENT (OUTPATIENT)
Age: 43
End: 2023-02-15

## 2023-02-16 ENCOUNTER — APPOINTMENT (OUTPATIENT)
Dept: INFECTIOUS DISEASE | Facility: CLINIC | Age: 43
End: 2023-02-16

## 2023-02-16 PROBLEM — F25.9 SCHIZOAFFECTIVE DISORDER, UNSPECIFIED: Chronic | Status: ACTIVE | Noted: 2023-01-25

## 2023-02-21 ENCOUNTER — NON-APPOINTMENT (OUTPATIENT)
Age: 43
End: 2023-02-21

## 2023-02-27 ENCOUNTER — NON-APPOINTMENT (OUTPATIENT)
Age: 43
End: 2023-02-27

## 2023-02-28 ENCOUNTER — APPOINTMENT (OUTPATIENT)
Dept: INFECTIOUS DISEASE | Facility: CLINIC | Age: 43
End: 2023-02-28

## 2023-02-28 ENCOUNTER — NON-APPOINTMENT (OUTPATIENT)
Age: 43
End: 2023-02-28

## 2023-03-01 ENCOUNTER — NON-APPOINTMENT (OUTPATIENT)
Age: 43
End: 2023-03-01

## 2023-03-02 ENCOUNTER — NON-APPOINTMENT (OUTPATIENT)
Age: 43
End: 2023-03-02

## 2023-03-08 ENCOUNTER — NON-APPOINTMENT (OUTPATIENT)
Age: 43
End: 2023-03-08

## 2023-03-09 ENCOUNTER — APPOINTMENT (OUTPATIENT)
Dept: INFECTIOUS DISEASE | Facility: CLINIC | Age: 43
End: 2023-03-09

## 2023-03-09 ENCOUNTER — NON-APPOINTMENT (OUTPATIENT)
Age: 43
End: 2023-03-09

## 2023-03-14 ENCOUNTER — NON-APPOINTMENT (OUTPATIENT)
Age: 43
End: 2023-03-14

## 2023-03-15 ENCOUNTER — NON-APPOINTMENT (OUTPATIENT)
Age: 43
End: 2023-03-15

## 2023-03-16 ENCOUNTER — APPOINTMENT (OUTPATIENT)
Dept: INFECTIOUS DISEASE | Facility: CLINIC | Age: 43
End: 2023-03-16
Payer: MEDICAID

## 2023-03-16 ENCOUNTER — NON-APPOINTMENT (OUTPATIENT)
Age: 43
End: 2023-03-16

## 2023-03-16 PROCEDURE — 99442: CPT

## 2023-03-16 NOTE — REASON FOR VISIT
[Patient preference] : as per patient preference [Telephone (audio) - Individual/Group] : This telephonic visit was provided via audio only technology. [No access to tele-video equipment] : Patient lacks access to tele-video equipment [Medical Office: ([facility])___] : The provider was located at the medical office in [unfilled]. [Verbal consent obtained from patient/other participant(s)] : Verbal consent for telehealth/telephonic services obtained from patient/other participant(s)

## 2023-03-16 NOTE — HISTORY OF PRESENT ILLNESS
[FreeTextEntry1] : Pt is a 41 yr old woman with a hx of HIV diagnosed in February 2022, initially seeking help because of her insomnia and anxiety especially at night.. She is now living with her 7 yr old son since her 20 yr old daughter moved out. She had been overwhelmed as her daughter had been leaving her baby daughter with pt. She says that she had been seeing a psychiatrist who was treating her with Risperidone but she stopped seeing the psychiatrist and stopped taking Risperidone which she feels was not helpful. She agrees that Seroquel may be helpful since this may help her sleep. She says that her mother suffers from mental health problems and had been on Seroquel which helped her. She denies having any past history of any suicidality and denies any history of paranoia or other symptoms of psychosis. She has no hx of substance use. 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 She admits that she had felt depressed and anxious but is able to enjoy relaxing at home watching TV ([place] and [place] and other shows). She denies having any past history of psychiatric hospitalizations. She is feeling better and sleeping better on the combination of low dose Seroquel, Trazodone. and Neurontin for the neuropathic pain and anxiety. \par

## 2023-03-16 NOTE — RISK ASSESSMENT
[No, patient denies ideation or behavior] : No, patient denies ideation or behavior [Low acute suicide risk] : Low acute suicide risk [No] : No [Yes] : Safety Plan completed/updated (for individuals at risk): Yes

## 2023-03-23 ENCOUNTER — APPOINTMENT (OUTPATIENT)
Dept: INFECTIOUS DISEASE | Facility: CLINIC | Age: 43
End: 2023-03-23

## 2023-03-24 ENCOUNTER — NON-APPOINTMENT (OUTPATIENT)
Age: 43
End: 2023-03-24

## 2023-03-27 ENCOUNTER — NON-APPOINTMENT (OUTPATIENT)
Age: 43
End: 2023-03-27

## 2023-03-28 ENCOUNTER — NON-APPOINTMENT (OUTPATIENT)
Age: 43
End: 2023-03-28

## 2023-03-28 ENCOUNTER — APPOINTMENT (OUTPATIENT)
Dept: INFECTIOUS DISEASE | Facility: CLINIC | Age: 43
End: 2023-03-28

## 2023-04-06 ENCOUNTER — NON-APPOINTMENT (OUTPATIENT)
Age: 43
End: 2023-04-06

## 2023-04-17 ENCOUNTER — NON-APPOINTMENT (OUTPATIENT)
Age: 43
End: 2023-04-17

## 2023-04-19 ENCOUNTER — NON-APPOINTMENT (OUTPATIENT)
Age: 43
End: 2023-04-19

## 2023-04-20 ENCOUNTER — NON-APPOINTMENT (OUTPATIENT)
Age: 43
End: 2023-04-20

## 2023-04-20 ENCOUNTER — APPOINTMENT (OUTPATIENT)
Dept: INFECTIOUS DISEASE | Facility: CLINIC | Age: 43
End: 2023-04-20

## 2023-04-21 ENCOUNTER — NON-APPOINTMENT (OUTPATIENT)
Age: 43
End: 2023-04-21

## 2023-05-11 ENCOUNTER — NON-APPOINTMENT (OUTPATIENT)
Age: 43
End: 2023-05-11

## 2023-05-31 ENCOUNTER — NON-APPOINTMENT (OUTPATIENT)
Age: 43
End: 2023-05-31

## 2023-05-31 ENCOUNTER — APPOINTMENT (OUTPATIENT)
Dept: INFECTIOUS DISEASE | Facility: CLINIC | Age: 43
End: 2023-05-31

## 2023-07-13 ENCOUNTER — NON-APPOINTMENT (OUTPATIENT)
Age: 43
End: 2023-07-13

## 2023-09-13 ENCOUNTER — RX RENEWAL (OUTPATIENT)
Age: 43
End: 2023-09-13

## 2023-09-13 RX ORDER — EMTRICITABINE, RILPIVIRINE HYDROCHLORIDE, AND TENOFOVIR ALAFENAMIDE 200; 25; 25 MG/1; MG/1; MG/1
200-25-25 TABLET ORAL
Qty: 30 | Refills: 0 | Status: ACTIVE | COMMUNITY
Start: 2022-03-17 | End: 1900-01-01

## 2023-09-13 RX ORDER — LABETALOL HYDROCHLORIDE 300 MG/1
300 TABLET, FILM COATED ORAL
Qty: 60 | Refills: 0 | Status: ACTIVE | COMMUNITY
Start: 2022-03-17 | End: 1900-01-01

## 2023-09-13 RX ORDER — ATORVASTATIN CALCIUM 10 MG/1
10 TABLET, FILM COATED ORAL
Qty: 30 | Refills: 0 | Status: ACTIVE | COMMUNITY
Start: 2022-04-18 | End: 1900-01-01

## 2023-10-12 ENCOUNTER — NON-APPOINTMENT (OUTPATIENT)
Age: 43
End: 2023-10-12

## 2023-11-14 ENCOUNTER — APPOINTMENT (OUTPATIENT)
Dept: INFECTIOUS DISEASE | Facility: CLINIC | Age: 43
End: 2023-11-14

## 2023-11-15 ENCOUNTER — NON-APPOINTMENT (OUTPATIENT)
Age: 43
End: 2023-11-15

## 2023-11-16 ENCOUNTER — NON-APPOINTMENT (OUTPATIENT)
Age: 43
End: 2023-11-16

## 2023-11-29 ENCOUNTER — NON-APPOINTMENT (OUTPATIENT)
Age: 43
End: 2023-11-29

## 2023-12-12 ENCOUNTER — NON-APPOINTMENT (OUTPATIENT)
Age: 43
End: 2023-12-12

## 2024-01-30 ENCOUNTER — NON-APPOINTMENT (OUTPATIENT)
Age: 44
End: 2024-01-30

## 2024-02-01 ENCOUNTER — APPOINTMENT (OUTPATIENT)
Dept: INFECTIOUS DISEASE | Facility: CLINIC | Age: 44
End: 2024-02-01
Payer: MEDICAID

## 2024-02-01 ENCOUNTER — OUTPATIENT (OUTPATIENT)
Dept: OUTPATIENT SERVICES | Facility: HOSPITAL | Age: 44
LOS: 1 days | End: 2024-02-01
Payer: COMMERCIAL

## 2024-02-01 PROCEDURE — G0463: CPT

## 2024-02-01 PROCEDURE — ZZZZZ: CPT

## 2024-02-01 RX ORDER — TRAZODONE HYDROCHLORIDE 50 MG/1
50 TABLET ORAL
Qty: 60 | Refills: 3 | Status: ACTIVE | COMMUNITY
Start: 2022-12-15 | End: 1900-01-01

## 2024-02-01 RX ORDER — QUETIAPINE FUMARATE 50 MG/1
50 TABLET ORAL
Qty: 60 | Refills: 3 | Status: ACTIVE | COMMUNITY
Start: 2022-11-17 | End: 1900-01-01

## 2024-02-01 NOTE — REASON FOR VISIT
[Telephone (audio) - Individual/Group] : This telephonic visit was provided via audio only technology. [No access to tele-video equipment] : Patient lacks access to tele-video equipment [Medical Office: ([facility])___] : The provider was located at the medical office in [unfilled]. [Home] : The patient, [unfilled], was located at home, [unfilled], at the time of the visit. [Verbal consent obtained from patient/other participant(s)] : Verbal consent for telehealth/telephonic services obtained from patient/other participant(s)

## 2024-02-01 NOTE — DISCUSSION/SUMMARY
[FreeTextEntry1] : Pt is a 41 yr old woman with a hx of HIV diagnosed in February 2022, initially seeking help because of her insomnia and anxiety especially at night.. She is now living with her 8 yr old son since her 21 yr old daughter moved out. She had been overwhelmed as her daughter had been leaving her baby daughter with pt. She says that she had been seeing a psychiatrist who was treating her with Risperidone but she stopped seeing the psychiatrist and stopped taking Risperidone which she feels was not helpful. She agrees that Seroquel may be helpful since this may help her sleep. She says that her mother suffers from mental health problems and had been on Seroquel which helped her. She denies having any past history of any suicidality and denies any history of paranoia or other symptoms of psychosis. She has no hx of substance use. 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 She admits that she had felt depressed and anxious but is able to enjoy relaxing at home watching TV ([place] and [place] and other shows). She denies having any past history of psychiatric hospitalizations. She is feeling better and sleeping better on the combination of low dose Seroquel, Trazodone. and Neurontin for the neuropathic pain and anxiety. [Low acute suicide risk] : Low acute suicide risk [No] : No

## 2024-02-01 NOTE — HISTORY OF PRESENT ILLNESS
[FreeTextEntry1] : Pt had not returned for follow up for almost a year, and reports that she has been out of her medications. She is spending time at home watching her 2 yr old baby grand daughter while her daughter had been working but is now out of work but sick at home.  Her 8 yr old son is in third grade and she says he is doing well.  She is requesting prescriptions to help her mood and sleep.  [FreeTextEntry2] : Pt is a 41 yr old woman with a hx of HIV diagnosed in February 2022, initially seeking help because of her insomnia and anxiety especially at night.. She is now living with her 8 yr old son since her 21 yr old daughter moved out. She had been overwhelmed as her daughter had been leaving her baby daughter with pt. She says that she had been seeing a psychiatrist who was treating her with Risperidone but she stopped seeing the psychiatrist and stopped taking Risperidone which she feels was not helpful. She agrees that Seroquel may be helpful since this may help her sleep. She says that her mother suffers from mental health problems and had been on Seroquel which helped her. She denies having any past history of any suicidality and denies any history of paranoia or other symptoms of psychosis. She has no hx of substance use. 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 She admits that she had felt depressed and anxious but is able to enjoy relaxing at home watching TV ([place] and [place] and other shows). She denies having any past history of psychiatric hospitalizations. She is feeling better and sleeping better on the combination of low dose Seroquel, Trazodone. and Neurontin for the neuropathic pain and anxiety.

## 2024-02-05 ENCOUNTER — NON-APPOINTMENT (OUTPATIENT)
Age: 44
End: 2024-02-05

## 2024-02-09 DIAGNOSIS — B20 HUMAN IMMUNODEFICIENCY VIRUS [HIV] DISEASE: ICD-10-CM

## 2024-02-14 DIAGNOSIS — F33.2 MAJOR DEPRESSIVE DISORDER, RECURRENT SEVERE WITHOUT PSYCHOTIC FEATURES: ICD-10-CM

## 2024-02-20 ENCOUNTER — NON-APPOINTMENT (OUTPATIENT)
Age: 44
End: 2024-02-20

## 2024-02-22 ENCOUNTER — APPOINTMENT (OUTPATIENT)
Dept: INFECTIOUS DISEASE | Facility: CLINIC | Age: 44
End: 2024-02-22

## 2024-02-23 ENCOUNTER — NON-APPOINTMENT (OUTPATIENT)
Age: 44
End: 2024-02-23

## 2024-02-26 ENCOUNTER — NON-APPOINTMENT (OUTPATIENT)
Age: 44
End: 2024-02-26

## 2024-07-25 ENCOUNTER — LABORATORY RESULT (OUTPATIENT)
Age: 44
End: 2024-07-25

## 2024-07-25 ENCOUNTER — APPOINTMENT (OUTPATIENT)
Dept: INFECTIOUS DISEASE | Facility: CLINIC | Age: 44
End: 2024-07-25

## 2024-07-25 VITALS
OXYGEN SATURATION: 99 % | SYSTOLIC BLOOD PRESSURE: 105 MMHG | WEIGHT: 140 LBS | DIASTOLIC BLOOD PRESSURE: 61 MMHG | HEART RATE: 79 BPM | HEIGHT: 66 IN | BODY MASS INDEX: 22.5 KG/M2 | TEMPERATURE: 97.8 F

## 2024-07-25 DIAGNOSIS — Z92.89 PERSONAL HISTORY OF OTHER MEDICAL TREATMENT: ICD-10-CM

## 2024-07-25 DIAGNOSIS — R18.8 UNSPECIFIED CIRRHOSIS OF LIVER: ICD-10-CM

## 2024-07-25 DIAGNOSIS — H61.23 IMPACTED CERUMEN, BILATERAL: ICD-10-CM

## 2024-07-25 DIAGNOSIS — K74.60 UNSPECIFIED CIRRHOSIS OF LIVER: ICD-10-CM

## 2024-07-25 DIAGNOSIS — E01.0 IODINE-DEFICIENCY RELATED DIFFUSE (ENDEMIC) GOITER: ICD-10-CM

## 2024-07-25 DIAGNOSIS — B20 HUMAN IMMUNODEFICIENCY VIRUS [HIV] DISEASE: ICD-10-CM

## 2024-07-25 PROCEDURE — 99215 OFFICE O/P EST HI 40 MIN: CPT | Mod: 25

## 2024-07-25 PROCEDURE — 90677 PCV20 VACCINE IM: CPT

## 2024-07-25 PROCEDURE — G0009: CPT

## 2024-07-25 RX ORDER — SPIRONOLACTONE 50 MG/1
50 TABLET ORAL DAILY
Refills: 0 | Status: ACTIVE | COMMUNITY
Start: 2024-07-25

## 2024-07-25 RX ORDER — FOLIC ACID 1 MG/1
1 TABLET ORAL DAILY
Qty: 1 | Refills: 2 | Status: ACTIVE | COMMUNITY
Start: 2024-07-25

## 2024-07-25 RX ORDER — METHOCARBAMOL 750 MG/1
750 TABLET, FILM COATED ORAL 3 TIMES DAILY
Qty: 90 | Refills: 2 | Status: ACTIVE | COMMUNITY
Start: 2024-07-25

## 2024-07-25 RX ORDER — CHLORHEXIDINE GLUCONATE 4 %
325 (65 FE) LIQUID (ML) TOPICAL DAILY
Qty: 30 | Refills: 6 | Status: ACTIVE | COMMUNITY
Start: 2024-07-25

## 2024-07-25 RX ORDER — CARVEDILOL 3.12 MG/1
3.12 TABLET, FILM COATED ORAL TWICE DAILY
Refills: 0 | Status: ACTIVE | COMMUNITY
Start: 2024-07-25

## 2024-07-25 RX ORDER — MAG HYDROX/ALUMINUM HYD/SIMETH 400-400-40
400-400-40 SUSPENSION, ORAL (FINAL DOSE FORM) ORAL EVERY 6 HOURS
Refills: 0 | Status: ACTIVE | COMMUNITY
Start: 2024-07-25

## 2024-07-25 RX ORDER — BICTEGRAVIR SODIUM, EMTRICITABINE, AND TENOFOVIR ALAFENAMIDE FUMARATE 50; 200; 25 MG/1; MG/1; MG/1
50-200-25 TABLET ORAL
Qty: 30 | Refills: 4 | Status: ACTIVE | COMMUNITY
Start: 2024-07-25 | End: 1900-01-01

## 2024-07-25 RX ORDER — FAMOTIDINE 40 MG/1
40 TABLET, FILM COATED ORAL
Qty: 30 | Refills: 0 | Status: ACTIVE | COMMUNITY
Start: 2024-07-25

## 2024-07-25 RX ORDER — SENNOSIDES 8.6 MG TABLETS 8.6 MG/1
8.6 TABLET ORAL TWICE DAILY
Refills: 0 | Status: ACTIVE | COMMUNITY
Start: 2024-07-25

## 2024-07-25 RX ORDER — LORATADINE 10 MG
17 TABLET,DISINTEGRATING ORAL DAILY
Refills: 0 | Status: ACTIVE | COMMUNITY
Start: 2024-07-25

## 2024-07-25 RX ORDER — MULTIVITAMIN
TABLET ORAL
Qty: 90 | Refills: 1 | Status: ACTIVE | COMMUNITY
Start: 2024-07-25

## 2024-07-25 RX ORDER — GABAPENTIN 300 MG/1
300 CAPSULE ORAL
Qty: 90 | Refills: 6 | Status: ACTIVE | COMMUNITY
Start: 2024-07-25

## 2024-07-25 RX ORDER — CHLORHEXIDINE GLUCONATE 4 %
1000 LIQUID (ML) TOPICAL DAILY
Qty: 90 | Refills: 2 | Status: ACTIVE | COMMUNITY
Start: 2024-07-25

## 2024-07-25 RX ORDER — FUROSEMIDE 20 MG/1
20 TABLET ORAL DAILY
Refills: 0 | Status: ACTIVE | COMMUNITY
Start: 2024-07-25

## 2024-07-25 RX ORDER — HYDROXYZINE HYDROCHLORIDE 25 MG/1
25 TABLET ORAL EVERY 8 HOURS
Qty: 90 | Refills: 6 | Status: ACTIVE | COMMUNITY
Start: 2024-07-25

## 2024-07-25 RX ORDER — PREGABALIN 75 MG/1
75 CAPSULE ORAL 3 TIMES DAILY
Refills: 0 | Status: ACTIVE | COMMUNITY
Start: 2024-07-25

## 2024-07-25 NOTE — ASSESSMENT
[FreeTextEntry1] : HIV annual consult/ return to care.   7/25/2024 Plan  HIV  1. continue current treatment - refills given  2. do blood work 3. f/u one month 4. Pneumococcal 20 vaccine given today   Thyromegaly thyromegaly persists.   1. referral to Endocrine given   Murmur  noted with new onset murmur.  Denies any prior cardiology consult  1. referral to cardiology given   Cerumen Impaction  1. referral to ENT given  Cirrhosis  1. f/u with GI as recommended for further management.   [Treatment Education] : treatment education [Treatment Adherence] : treatment adherence [Rx Dose / Side Effects] : Rx dose/side effects [Medical Care Issues] : medical care issues

## 2024-07-25 NOTE — PHYSICAL EXAM
[General Appearance - Alert] : alert [General Appearance - In No Acute Distress] : in no acute distress [General Appearance - Well Nourished] : well nourished [PERRL With Normal Accommodation] : pupils were equal in size, round, reactive to light [Extraocular Movements] : extraocular movements were intact [Yellow Sclera (Icteric)] : the sclera were icteric [Outer Ear] : the ears and nose were normal in appearance [Hearing Threshold Finger Rub Not [place]] : hearing was normal [Completely Obscured] : completely [Cerumen in Canal] : by cerumen [Neck Appearance] : the appearance of the neck was normal [Neck Cervical Mass (___cm)] : no neck mass was observed [Jugular Venous Distention Increased] : there was no jugular-venous distention [Respiration, Rhythm And Depth] : normal respiratory rhythm and effort [Exaggerated Use Of Accessory Muscles For Inspiration] : no accessory muscle use [Auscultation Breath Sounds / Voice Sounds] : lungs were clear to auscultation bilaterally [Heart Sounds] : normal S1 and S2 [Heart Rate And Rhythm] : heart rate was normal and rhythm regular [Heart Sounds Gallop] : no gallops [Murmurs] : no murmurs [Heart Sounds Pericardial Friction Rub] : no pericardial rub [Edema] : there was no peripheral edema [Bowel Sounds] : normal bowel sounds [Abdomen Soft] : soft [Costovertebral Angle Tenderness] : no CVA tenderness [Liver Enlargement] : was enlarged [Musculoskeletal - Swelling] : no joint swelling [Range of Motion to Joints] : range of motion to joints [Nail Clubbing] : no clubbing  or cyanosis of the fingernails [Motor Tone] : muscle strength and tone were normal [] : no rash [Skin Lesions] : no skin lesions [Cranial Nerves] : cranial nerves 2-12 were intact [Sensation] : the sensory exam was normal to light touch and pinprick [Motor Exam] : the motor exam was normal [No Focal Deficits] : no focal deficits [Oriented To Time, Place, And Person] : oriented to person, place, and time [Affect] : the affect was normal [FreeTextEntry1] : jaundice

## 2024-07-25 NOTE — HISTORY OF PRESENT ILLNESS
[FreeTextEntry1] : 42 yo female here for HIV annual consult/ return to care.   taking Biktarvy daily with no missed doses or SE changed from Odefsey this year due to liver issues by hospital  had multiple hospitalizations due to liver issues  last admission was 7/7/2024 for worsening ascitis was admitted for 10 days 900ml fluid removed.  was discharged home  visiting nurse came to home once since discharge  pending making f/u consult with GI for liver cirrhosis pt unsure of plan for cirrhosis as of yet.  pt states the reason for cirrhosis is due to ETOH use in the past and medications she was previously taking.    appt with PCP in 2 weeks  Sexual hx : not sexually active      From previous consult 11/17/2022 :  40 yo female here for HIV f/u consult and results discussion taking Odefsey daily with no missed doses or SE  she is having tingling pain in bilat fingers. started a few weeks ago and is stable worse at night she takes Aleve with minimal result.  she did not f/u with GI since last consult has appt 12/9/2022 with GI.  Sexual hx ; pt not currently sexually active.     7/25/2024 Plan  HIV  1. continue current treatment - refills given  2. do blood work 3. f/u one month 4. Pneumococcal 20 vaccine given today   Thyromegaly thyromegaly persists.   1. referral to Endocrine given   Murmur  noted with new onset murmur.  Denies any prior cardiology consult  1. referral to cardiology given   Cerumen Impaction  1. referral to ENT given  Cirrhosis  1. f/u with GI as recommended for further management.

## 2024-07-26 DIAGNOSIS — E55.9 VITAMIN D DEFICIENCY, UNSPECIFIED: ICD-10-CM

## 2024-07-26 LAB
25(OH)D3 SERPL-MCNC: 11.5 NG/ML
ALBUMIN SERPL ELPH-MCNC: 2.4 G/DL
ALP BLD-CCNC: 173 U/L
ALT SERPL-CCNC: 34 U/L
ANION GAP SERPL CALC-SCNC: 10 MMOL/L
APPEARANCE: ABNORMAL
AST SERPL-CCNC: 88 U/L
BILIRUB SERPL-MCNC: 8 MG/DL
BILIRUBIN URINE: ABNORMAL
BLOOD URINE: NEGATIVE
BUN SERPL-MCNC: 6 MG/DL
C TRACH RRNA SPEC QL NAA+PROBE: NOT DETECTED
CALCIUM SERPL-MCNC: 7.7 MG/DL
CD3 CELLS # BLD: 834 CELLS/UL
CD3 CELLS NFR BLD: 84 %
CD3+CD4+ CELLS # BLD: 682 CELLS/UL
CD3+CD4+ CELLS NFR BLD: 68 %
CD3+CD4+ CELLS/CD3+CD8+ CLL SPEC: 4.84 RATIO
CD3+CD8+ CELLS # SPEC: 141 CELLS/UL
CD3+CD8+ CELLS NFR BLD: 14 %
CHLORIDE SERPL-SCNC: 106 MMOL/L
CHOLEST SERPL-MCNC: 125 MG/DL
CO2 SERPL-SCNC: 20 MMOL/L
COLOR: NORMAL
CREAT SERPL-MCNC: 0.63 MG/DL
EGFR: 114 ML/MIN/1.73M2
ESTIMATED AVERAGE GLUCOSE: 74 MG/DL
GLUCOSE QUALITATIVE U: NEGATIVE MG/DL
GLUCOSE SERPL-MCNC: 92 MG/DL
HBA1C MFR BLD HPLC: 4.2 %
HCT VFR BLD CALC: 27.4 %
HCV AB SER QL: NONREACTIVE
HCV S/CO RATIO: 0.17 S/CO
HDLC SERPL-MCNC: 20 MG/DL
HEPB DNA PCR INT: NOT DETECTED
HEPB DNA PCR LOG: NOT DETECTED LOGIU/ML
HGB BLD-MCNC: 9.6 G/DL
KETONES URINE: NEGATIVE MG/DL
LDLC SERPL CALC-MCNC: 88 MG/DL
LEUKOCYTE ESTERASE URINE: ABNORMAL
MCHC RBC-ENTMCNC: 32.1 PG
MCHC RBC-ENTMCNC: 35 GM/DL
MCV RBC AUTO: 91.6 FL
N GONORRHOEA RRNA SPEC QL NAA+PROBE: NOT DETECTED
NITRITE URINE: NEGATIVE
NONHDLC SERPL-MCNC: 105 MG/DL
PH URINE: 7
PLATELET # BLD AUTO: 129 K/UL
POTASSIUM SERPL-SCNC: 4.8 MMOL/L
PROT SERPL-MCNC: 7.8 G/DL
PROTEIN URINE: NEGATIVE MG/DL
PSA SERPL-MCNC: <0.01 NG/ML
RBC # BLD: 2.99 M/UL
RBC # FLD: 23.9 %
SODIUM SERPL-SCNC: 137 MMOL/L
SOURCE AMPLIFICATION: NORMAL
SPECIFIC GRAVITY URINE: 1.02
T PALLIDUM AB SER QL IA: NEGATIVE
TRIGL SERPL-MCNC: 84 MG/DL
TSH SERPL-ACNC: 6.66 UIU/ML
UROBILINOGEN URINE: 1 MG/DL
WBC # FLD AUTO: 5.68 K/UL

## 2024-07-26 RX ORDER — ERGOCALCIFEROL 1.25 MG/1
1.25 MG CAPSULE, LIQUID FILLED ORAL
Qty: 24 | Refills: 2 | Status: ACTIVE | COMMUNITY
Start: 2024-07-26 | End: 1900-01-01

## 2024-07-29 LAB
HIV1 RNA # SERPL NAA+PROBE: ABNORMAL
HIV1 RNA # SERPL NAA+PROBE: ABNORMAL COPIES/ML
M TB IFN-G BLD-IMP: NEGATIVE
QUANTIFERON TB PLUS MITOGEN MINUS NIL: >10 IU/ML
QUANTIFERON TB PLUS NIL: 0.02 IU/ML
QUANTIFERON TB PLUS TB1 MINUS NIL: 0.01 IU/ML
QUANTIFERON TB PLUS TB2 MINUS NIL: 0 IU/ML
VIRAL LOAD INTERP: NORMAL
VIRAL LOAD LOG: ABNORMAL LG COP/ML

## 2024-08-13 ENCOUNTER — APPOINTMENT (OUTPATIENT)
Dept: INFECTIOUS DISEASE | Facility: CLINIC | Age: 44
End: 2024-08-13

## 2024-08-22 ENCOUNTER — APPOINTMENT (OUTPATIENT)
Dept: INFECTIOUS DISEASE | Facility: CLINIC | Age: 44
End: 2024-08-22
Payer: MEDICAID

## 2024-08-22 VITALS
DIASTOLIC BLOOD PRESSURE: 70 MMHG | WEIGHT: 139 LBS | OXYGEN SATURATION: 100 % | SYSTOLIC BLOOD PRESSURE: 113 MMHG | HEART RATE: 71 BPM | TEMPERATURE: 98.2 F | HEIGHT: 66 IN | BODY MASS INDEX: 22.34 KG/M2

## 2024-08-22 DIAGNOSIS — E55.9 VITAMIN D DEFICIENCY, UNSPECIFIED: ICD-10-CM

## 2024-08-22 DIAGNOSIS — R01.1 CARDIAC MURMUR, UNSPECIFIED: ICD-10-CM

## 2024-08-22 DIAGNOSIS — R79.89 OTHER SPECIFIED ABNORMAL FINDINGS OF BLOOD CHEMISTRY: ICD-10-CM

## 2024-08-22 DIAGNOSIS — L29.9 PRURITUS, UNSPECIFIED: ICD-10-CM

## 2024-08-22 DIAGNOSIS — B20 HUMAN IMMUNODEFICIENCY VIRUS [HIV] DISEASE: ICD-10-CM

## 2024-08-22 DIAGNOSIS — E01.0 IODINE-DEFICIENCY RELATED DIFFUSE (ENDEMIC) GOITER: ICD-10-CM

## 2024-08-22 PROCEDURE — 99215 OFFICE O/P EST HI 40 MIN: CPT

## 2024-08-22 RX ORDER — HYDROXYZINE HYDROCHLORIDE 25 MG/1
25 TABLET ORAL 3 TIMES DAILY
Qty: 90 | Refills: 3 | Status: ACTIVE | COMMUNITY
Start: 2024-08-22 | End: 1900-01-01

## 2024-08-22 NOTE — PHYSICAL EXAM
[General Appearance - Alert] : alert [General Appearance - In No Acute Distress] : in no acute distress [General Appearance - Well Nourished] : well nourished [PERRL With Normal Accommodation] : pupils were equal in size, round, reactive to light [Extraocular Movements] : extraocular movements were intact [Yellow Sclera (Icteric)] : the sclera were icteric [Outer Ear] : the ears and nose were normal in appearance [Hearing Threshold Finger Rub Not [place]] : hearing was normal [Completely Obscured] : completely [Cerumen in Canal] : by cerumen [Neck Appearance] : the appearance of the neck was normal [Neck Cervical Mass (___cm)] : no neck mass was observed [Jugular Venous Distention Increased] : there was no jugular-venous distention [Respiration, Rhythm And Depth] : normal respiratory rhythm and effort [Exaggerated Use Of Accessory Muscles For Inspiration] : no accessory muscle use [Auscultation Breath Sounds / Voice Sounds] : lungs were clear to auscultation bilaterally [Heart Rate And Rhythm] : heart rate was normal and rhythm regular [Heart Sounds] : normal S1 and S2 [Heart Sounds Gallop] : no gallops [Murmurs] : no murmurs [Heart Sounds Pericardial Friction Rub] : no pericardial rub [Edema] : there was no peripheral edema [Bowel Sounds] : normal bowel sounds [Abdomen Soft] : soft [Costovertebral Angle Tenderness] : no CVA tenderness [Liver Enlargement] : was enlarged [Musculoskeletal - Swelling] : no joint swelling [Range of Motion to Joints] : range of motion to joints [Nail Clubbing] : no clubbing  or cyanosis of the fingernails [Motor Tone] : muscle strength and tone were normal [] : no rash [Skin Lesions] : no skin lesions [FreeTextEntry1] : jaundice [Cranial Nerves] : cranial nerves 2-12 were intact [Sensation] : the sensory exam was normal to light touch and pinprick [Motor Exam] : the motor exam was normal [No Focal Deficits] : no focal deficits [Oriented To Time, Place, And Person] : oriented to person, place, and time [Affect] : the affect was normal

## 2024-08-22 NOTE — HISTORY OF PRESENT ILLNESS
[FreeTextEntry1] : 44 yo female here for HIV f/u consult and results discussion taking biktarvy daily with no missed doses or SE  noted with generalized itching- worse at night  denies placing any creams for this.  noted with flaking skin on eyebrows.    pt has not f/u with cardiology for hx of murmur.    From previous consult 7/25/2024 :  44 yo female here for HIV annual consult/ return to care. taking Biktarvy daily with no missed doses or SE changed from Odefsey this year due to liver issues by hospital had multiple hospitalizations due to liver issues last admission was 7/7/2024 for worsening ascitis was admitted for 10 days 900ml fluid removed. was discharged home visiting nurse came to home once since discharge  pending making f/u consult with GI for liver cirrhosis pt unsure of plan for cirrhosis as of yet. pt states the reason for cirrhosis is due to ETOH use in the past and medications she was previously taking.  appt with PCP in 2 weeks  Sexual hx : not sexually active    8/22/2024 Plan  HIV  all test results from previous consult reviewed with patient.  1. continue current treatment  2. f/u 6 months  Itching  1. start Hydroxyzine one tab TID PRN  2. start Claritin one tab daily.  3. f/u with hepatology for further management   Low Vitamin D  1. continue Vitamin D as recommended   Elevated TSH 1. consult with Endocrine for further evaluation - copy of referral given today   Murmur  1. f/u with cardiology for further management.

## 2024-10-02 ENCOUNTER — APPOINTMENT (OUTPATIENT)
Dept: OTOLARYNGOLOGY | Facility: CLINIC | Age: 44
End: 2024-10-02

## 2024-10-03 ENCOUNTER — APPOINTMENT (OUTPATIENT)
Dept: INFECTIOUS DISEASE | Facility: CLINIC | Age: 44
End: 2024-10-03

## 2024-11-04 ENCOUNTER — APPOINTMENT (OUTPATIENT)
Dept: ENDOCRINOLOGY | Facility: CLINIC | Age: 44
End: 2024-11-04
Payer: MEDICAID

## 2024-11-04 VITALS
OXYGEN SATURATION: 99 % | HEIGHT: 66 IN | BODY MASS INDEX: 25.07 KG/M2 | HEART RATE: 83 BPM | DIASTOLIC BLOOD PRESSURE: 70 MMHG | WEIGHT: 156 LBS | SYSTOLIC BLOOD PRESSURE: 118 MMHG

## 2024-11-04 DIAGNOSIS — G47.00 INSOMNIA, UNSPECIFIED: ICD-10-CM

## 2024-11-04 DIAGNOSIS — E01.0 IODINE-DEFICIENCY RELATED DIFFUSE (ENDEMIC) GOITER: ICD-10-CM

## 2024-11-04 DIAGNOSIS — R79.89 OTHER SPECIFIED ABNORMAL FINDINGS OF BLOOD CHEMISTRY: ICD-10-CM

## 2024-11-04 PROCEDURE — G2211 COMPLEX E/M VISIT ADD ON: CPT | Mod: NC

## 2024-11-04 PROCEDURE — 99204 OFFICE O/P NEW MOD 45 MIN: CPT

## 2024-11-05 LAB
T4 FREE SERPL-MCNC: 1 NG/DL
THYROGLOB AB SERPL-ACNC: 17.2 IU/ML
THYROPEROXIDASE AB SERPL IA-ACNC: 17.5 IU/ML
TSH SERPL-ACNC: 2.31 UIU/ML

## 2024-11-07 ENCOUNTER — APPOINTMENT (OUTPATIENT)
Dept: OTOLARYNGOLOGY | Facility: CLINIC | Age: 44
End: 2024-11-07

## 2025-02-04 ENCOUNTER — APPOINTMENT (OUTPATIENT)
Dept: INFECTIOUS DISEASE | Facility: CLINIC | Age: 45
End: 2025-02-04
Payer: MEDICAID

## 2025-02-04 ENCOUNTER — LABORATORY RESULT (OUTPATIENT)
Age: 45
End: 2025-02-04

## 2025-02-04 ENCOUNTER — NON-APPOINTMENT (OUTPATIENT)
Age: 45
End: 2025-02-04

## 2025-02-04 VITALS
SYSTOLIC BLOOD PRESSURE: 103 MMHG | HEIGHT: 66 IN | DIASTOLIC BLOOD PRESSURE: 69 MMHG | TEMPERATURE: 98.3 F | WEIGHT: 155 LBS | BODY MASS INDEX: 24.91 KG/M2 | OXYGEN SATURATION: 99 % | HEART RATE: 87 BPM

## 2025-02-04 DIAGNOSIS — R17 UNSPECIFIED JAUNDICE: ICD-10-CM

## 2025-02-04 DIAGNOSIS — B20 HUMAN IMMUNODEFICIENCY VIRUS [HIV] DISEASE: ICD-10-CM

## 2025-02-04 DIAGNOSIS — R01.1 CARDIAC MURMUR, UNSPECIFIED: ICD-10-CM

## 2025-02-04 DIAGNOSIS — G47.00 INSOMNIA, UNSPECIFIED: ICD-10-CM

## 2025-02-04 DIAGNOSIS — E55.9 VITAMIN D DEFICIENCY, UNSPECIFIED: ICD-10-CM

## 2025-02-04 DIAGNOSIS — Z92.89 PERSONAL HISTORY OF OTHER MEDICAL TREATMENT: ICD-10-CM

## 2025-02-04 DIAGNOSIS — E01.0 IODINE-DEFICIENCY RELATED DIFFUSE (ENDEMIC) GOITER: ICD-10-CM

## 2025-02-04 DIAGNOSIS — H61.23 IMPACTED CERUMEN, BILATERAL: ICD-10-CM

## 2025-02-04 PROCEDURE — 90656 IIV3 VACC NO PRSV 0.5 ML IM: CPT

## 2025-02-04 PROCEDURE — G0008: CPT

## 2025-02-04 PROCEDURE — 99215 OFFICE O/P EST HI 40 MIN: CPT | Mod: 25

## 2025-02-04 RX ORDER — CHLORHEXIDINE GLUCONATE 4 %
5 LIQUID (ML) TOPICAL
Qty: 30 | Refills: 6 | Status: ACTIVE | COMMUNITY
Start: 2025-02-04 | End: 1900-01-01

## 2025-02-05 DIAGNOSIS — D64.9 ANEMIA, UNSPECIFIED: ICD-10-CM

## 2025-02-05 DIAGNOSIS — M25.542 PAIN IN JOINTS OF RIGHT HAND: ICD-10-CM

## 2025-02-05 DIAGNOSIS — M25.541 PAIN IN JOINTS OF RIGHT HAND: ICD-10-CM

## 2025-02-05 LAB
25(OH)D3 SERPL-MCNC: 49.2 NG/ML
ALBUMIN SERPL ELPH-MCNC: 2.9 G/DL
ALP BLD-CCNC: 163 U/L
ALT SERPL-CCNC: 23 U/L
ANION GAP SERPL CALC-SCNC: 8 MMOL/L
AST SERPL-CCNC: 57 U/L
BILIRUB SERPL-MCNC: 2.4 MG/DL
BUN SERPL-MCNC: 11 MG/DL
CALCIUM SERPL-MCNC: 8.4 MG/DL
CD3 CELLS # BLD: 720 CELLS/UL
CD3 CELLS NFR BLD: 63 %
CD3+CD4+ CELLS # BLD: 570 CELLS/UL
CD3+CD4+ CELLS NFR BLD: 50 %
CD3+CD4+ CELLS/CD3+CD8+ CLL SPEC: 4.38 RATIO
CD3+CD8+ CELLS # SPEC: 130 CELLS/UL
CD3+CD8+ CELLS NFR BLD: 11 %
CHLORIDE SERPL-SCNC: 108 MMOL/L
CO2 SERPL-SCNC: 21 MMOL/L
CREAT SERPL-MCNC: 0.78 MG/DL
CRP SERPL-MCNC: 5 MG/L
EGFR: 97 ML/MIN/1.73M2
ERYTHROCYTE [SEDIMENTATION RATE] IN BLOOD BY WESTERGREN METHOD: 52 MM/HR
GLUCOSE SERPL-MCNC: 113 MG/DL
HCT VFR BLD CALC: 27.7 %
HGB BLD-MCNC: 9.6 G/DL
HIV1 RNA # SERPL NAA+PROBE: ABNORMAL
HIV1 RNA # SERPL NAA+PROBE: ABNORMAL COPIES/ML
MCHC RBC-ENTMCNC: 29.8 PG
MCHC RBC-ENTMCNC: 34.7 G/DL
MCV RBC AUTO: 86 FL
PLATELET # BLD AUTO: 88 K/UL
POTASSIUM SERPL-SCNC: 4.4 MMOL/L
PROT SERPL-MCNC: 6.7 G/DL
RBC # BLD: 3.22 M/UL
RBC # FLD: 18.6 %
RHEUMATOID FACT SER QL: <10 IU/ML
SODIUM SERPL-SCNC: 136 MMOL/L
VIRAL LOAD INTERP: NORMAL
VIRAL LOAD LOG: ABNORMAL LG COP/ML
WBC # FLD AUTO: 4.3 K/UL

## 2025-02-06 LAB
R RICKETTSI IGG CSF-ACNC: NORMAL
R RICKETTSI IGM CSF-ACNC: NORMAL
ROCKY MT SPOTTED FEVER COMMENT: NORMAL

## 2025-02-10 LAB
ANA SER IF-ACNC: NEGATIVE
B MICROTI IGG TITR SER: NORMAL
BABESIA ANTIBODIES, IGM: NORMAL

## 2025-02-19 ENCOUNTER — NON-APPOINTMENT (OUTPATIENT)
Age: 45
End: 2025-02-19

## 2025-03-13 ENCOUNTER — APPOINTMENT (OUTPATIENT)
Dept: INFECTIOUS DISEASE | Facility: CLINIC | Age: 45
End: 2025-03-13

## 2025-04-02 ENCOUNTER — RX RENEWAL (OUTPATIENT)
Age: 45
End: 2025-04-02

## 2025-05-06 ENCOUNTER — APPOINTMENT (OUTPATIENT)
Dept: ENDOCRINOLOGY | Facility: CLINIC | Age: 45
End: 2025-05-06

## 2025-07-21 ENCOUNTER — APPOINTMENT (OUTPATIENT)
Dept: HEPATOLOGY | Facility: CLINIC | Age: 45
End: 2025-07-21
Payer: MEDICAID

## 2025-07-21 VITALS
HEART RATE: 65 BPM | WEIGHT: 172 LBS | SYSTOLIC BLOOD PRESSURE: 145 MMHG | DIASTOLIC BLOOD PRESSURE: 84 MMHG | TEMPERATURE: 97.9 F | HEIGHT: 66 IN | OXYGEN SATURATION: 98 % | BODY MASS INDEX: 27.64 KG/M2

## 2025-07-21 DIAGNOSIS — K74.60 UNSPECIFIED CIRRHOSIS OF LIVER: ICD-10-CM

## 2025-07-21 LAB
AFP-TM SERPL-MCNC: 2.5 NG/ML
ALBUMIN SERPL ELPH-MCNC: 3.1 G/DL
ALP BLD-CCNC: 178 U/L
ALT SERPL-CCNC: 25 U/L
ANION GAP SERPL CALC-SCNC: 9 MMOL/L
AST SERPL-CCNC: 56 U/L
BASOPHILS # BLD AUTO: 0.02 K/UL
BASOPHILS NFR BLD AUTO: 0.5 %
BILIRUB SERPL-MCNC: 2.5 MG/DL
BUN SERPL-MCNC: 7 MG/DL
CALCIUM SERPL-MCNC: 8.4 MG/DL
CHLORIDE SERPL-SCNC: 112 MMOL/L
CO2 SERPL-SCNC: 20 MMOL/L
CREAT SERPL-MCNC: 0.73 MG/DL
EGFRCR SERPLBLD CKD-EPI 2021: 105 ML/MIN/1.73M2
EOSINOPHIL # BLD AUTO: 0.1 K/UL
EOSINOPHIL NFR BLD AUTO: 2.4 %
GGT SERPL-CCNC: 71 U/L
GLUCOSE SERPL-MCNC: 122 MG/DL
HBV CORE IGG+IGM SER QL: NONREACTIVE
HBV SURFACE AB SERPL IA-ACNC: 6.7 MIU/ML
HBV SURFACE AG SER QL: NONREACTIVE
HCT VFR BLD CALC: 33.9 %
HCV AB SER QL: NONREACTIVE
HCV S/CO RATIO: 0.17 S/CO
HEPATITIS A IGG ANTIBODY: NONREACTIVE
HGB BLD-MCNC: 11.6 G/DL
IMM GRANULOCYTES NFR BLD AUTO: 0.2 %
INR PPP: 1.54 RATIO
LYMPHOCYTES # BLD AUTO: 1.91 K/UL
LYMPHOCYTES NFR BLD AUTO: 45.4 %
MAN DIFF?: NORMAL
MCHC RBC-ENTMCNC: 30.6 PG
MCHC RBC-ENTMCNC: 34.2 G/DL
MCV RBC AUTO: 89.4 FL
MONOCYTES # BLD AUTO: 0.61 K/UL
MONOCYTES NFR BLD AUTO: 14.5 %
NEUTROPHILS # BLD AUTO: 1.56 K/UL
NEUTROPHILS NFR BLD AUTO: 37 %
PLATELET # BLD AUTO: 104 K/UL
POTASSIUM SERPL-SCNC: 4 MMOL/L
PROT SERPL-MCNC: 7.2 G/DL
PT BLD: 18.1 SEC
RBC # BLD: 3.79 M/UL
RBC # FLD: 17.3 %
SODIUM SERPL-SCNC: 142 MMOL/L
WBC # FLD AUTO: 4.21 K/UL

## 2025-07-21 PROCEDURE — 99204 OFFICE O/P NEW MOD 45 MIN: CPT

## 2025-07-22 LAB — ABORH: NORMAL

## 2025-08-12 ENCOUNTER — LABORATORY RESULT (OUTPATIENT)
Age: 45
End: 2025-08-12

## 2025-08-12 ENCOUNTER — APPOINTMENT (OUTPATIENT)
Dept: INFECTIOUS DISEASE | Facility: CLINIC | Age: 45
End: 2025-08-12
Payer: MEDICAID

## 2025-08-12 ENCOUNTER — APPOINTMENT (OUTPATIENT)
Dept: INFECTIOUS DISEASE | Facility: CLINIC | Age: 45
End: 2025-08-12

## 2025-08-12 ENCOUNTER — NON-APPOINTMENT (OUTPATIENT)
Age: 45
End: 2025-08-12

## 2025-08-12 VITALS
HEIGHT: 66 IN | TEMPERATURE: 79.5 F | OXYGEN SATURATION: 100 % | DIASTOLIC BLOOD PRESSURE: 75 MMHG | HEART RATE: 72 BPM | BODY MASS INDEX: 27.16 KG/M2 | WEIGHT: 169 LBS | SYSTOLIC BLOOD PRESSURE: 127 MMHG

## 2025-08-12 DIAGNOSIS — E78.2 MIXED HYPERLIPIDEMIA: ICD-10-CM

## 2025-08-12 DIAGNOSIS — E55.9 VITAMIN D DEFICIENCY, UNSPECIFIED: ICD-10-CM

## 2025-08-12 DIAGNOSIS — G47.00 INSOMNIA, UNSPECIFIED: ICD-10-CM

## 2025-08-12 DIAGNOSIS — R17 UNSPECIFIED JAUNDICE: ICD-10-CM

## 2025-08-12 DIAGNOSIS — H61.23 IMPACTED CERUMEN, BILATERAL: ICD-10-CM

## 2025-08-12 DIAGNOSIS — Z78.9 OTHER SPECIFIED HEALTH STATUS: ICD-10-CM

## 2025-08-12 DIAGNOSIS — D64.9 ANEMIA, UNSPECIFIED: ICD-10-CM

## 2025-08-12 DIAGNOSIS — B20 HUMAN IMMUNODEFICIENCY VIRUS [HIV] DISEASE: ICD-10-CM

## 2025-08-12 PROCEDURE — 99215 OFFICE O/P EST HI 40 MIN: CPT | Mod: 25

## 2025-08-12 PROCEDURE — 90472 IMMUNIZATION ADMIN EACH ADD: CPT

## 2025-08-12 PROCEDURE — 96156 HLTH BHV ASSMT/REASSESSMENT: CPT

## 2025-08-12 PROCEDURE — 90715 TDAP VACCINE 7 YRS/> IM: CPT

## 2025-08-12 PROCEDURE — 90684 PCV21 VACCINE IM: CPT

## 2025-08-12 PROCEDURE — G0009: CPT

## 2025-08-13 PROBLEM — Z78.9 IMMUNE TO HEPATITIS A: Status: ACTIVE | Noted: 2025-08-13

## 2025-08-13 PROBLEM — Z78.9 IMMUNE TO HEPATITIS B: Status: ACTIVE | Noted: 2025-08-13

## 2025-08-13 LAB
25(OH)D3 SERPL-MCNC: 42.5 NG/ML
ALBUMIN SERPL ELPH-MCNC: 3.2 G/DL
ALP BLD-CCNC: 164 U/L
ALT SERPL-CCNC: 22 U/L
ANION GAP SERPL CALC-SCNC: 9 MMOL/L
APPEARANCE: CLEAR
AST SERPL-CCNC: 48 U/L
BILIRUB SERPL-MCNC: 2.5 MG/DL
BILIRUBIN URINE: NEGATIVE
BLOOD URINE: NEGATIVE
BUN SERPL-MCNC: 8 MG/DL
CALCIUM SERPL-MCNC: 8.8 MG/DL
CD3 CELLS # BLD: 865 CELLS/UL
CD3 CELLS NFR BLD: 54 %
CD3+CD4+ CELLS # BLD: 688 CELLS/UL
CD3+CD4+ CELLS NFR BLD: 43 %
CD3+CD4+ CELLS/CD3+CD8+ CLL SPEC: 4.58 RATIO
CD3+CD8+ CELLS # SPEC: 150 CELLS/UL
CD3+CD8+ CELLS NFR BLD: 9 %
CHLORIDE SERPL-SCNC: 109 MMOL/L
CHOLEST SERPL-MCNC: 145 MG/DL
CO2 SERPL-SCNC: 18 MMOL/L
COLOR: NORMAL
CREAT SERPL-MCNC: 0.69 MG/DL
EGFRCR SERPLBLD CKD-EPI 2021: 110 ML/MIN/1.73M2
ESTIMATED AVERAGE GLUCOSE: 91 MG/DL
FOLATE SERPL-MCNC: 12.8 NG/ML
GLUCOSE QUALITATIVE U: NEGATIVE MG/DL
GLUCOSE SERPL-MCNC: 123 MG/DL
HBA1C MFR BLD HPLC: 4.8 %
HBV DNA # SERPL NAA+PROBE: NOT DETECTED IU/ML
HCT VFR BLD CALC: 35 %
HCV AB SER QL: NONREACTIVE
HCV S/CO RATIO: 0.17 S/CO
HDLC SERPL-MCNC: 54 MG/DL
HEPB DNA PCR INT: NOT DETECTED
HEPB DNA PCR LOG: NOT DETECTED LOGIU/ML
HGB BLD-MCNC: 12.2 G/DL
HIV1 RNA # SERPL NAA+PROBE: NORMAL
HIV1 RNA # SERPL NAA+PROBE: NORMAL COPIES/ML
IRON SERPL-MCNC: 142 UG/DL
KETONES URINE: NEGATIVE MG/DL
LDLC SERPL-MCNC: 75 MG/DL
LEUKOCYTE ESTERASE URINE: ABNORMAL
MCHC RBC-ENTMCNC: 30.5 PG
MCHC RBC-ENTMCNC: 34.9 G/DL
MCV RBC AUTO: 87.5 FL
NITRITE URINE: NEGATIVE
NONHDLC SERPL-MCNC: 91 MG/DL
PH URINE: 7.5
PLATELET # BLD AUTO: 131 K/UL
POTASSIUM SERPL-SCNC: 5.2 MMOL/L
PROT SERPL-MCNC: 7.7 G/DL
PROTEIN URINE: NEGATIVE MG/DL
RBC # BLD: 4 M/UL
RBC # FLD: 16.7 %
SODIUM SERPL-SCNC: 136 MMOL/L
SPECIFIC GRAVITY URINE: 1.01
T PALLIDUM AB SER QL IA: NEGATIVE
TRIGL SERPL-MCNC: 82 MG/DL
TSH SERPL-ACNC: 1.72 UIU/ML
UROBILINOGEN URINE: 2 MG/DL
VIABILITY: NORMAL
VIRAL LOAD INTERP: NORMAL
VIRAL LOAD LOG: NORMAL LG COP/ML
VIT B12 SERPL-MCNC: >2000 PG/ML
WBC # FLD AUTO: 4.64 K/UL

## 2025-08-14 ENCOUNTER — NON-APPOINTMENT (OUTPATIENT)
Age: 45
End: 2025-08-14

## 2025-08-14 LAB
C TRACH RRNA SPEC QL NAA+PROBE: NOT DETECTED
M TB IFN-G BLD-IMP: NEGATIVE
N GONORRHOEA RRNA SPEC QL NAA+PROBE: NOT DETECTED
QUANTIFERON TB PLUS MITOGEN MINUS NIL: 7.67 IU/ML
QUANTIFERON TB PLUS NIL: 0.02 IU/ML
QUANTIFERON TB PLUS TB1 MINUS NIL: 0 IU/ML
QUANTIFERON TB PLUS TB2 MINUS NIL: 0 IU/ML
SOURCE AMPLIFICATION: NORMAL

## 2025-08-18 LAB — VIT B6 SERPL-MCNC: 6.4 UG/L

## 2025-09-08 ENCOUNTER — APPOINTMENT (OUTPATIENT)
Dept: HEPATOLOGY | Facility: CLINIC | Age: 45
End: 2025-09-08

## 2025-09-08 DIAGNOSIS — K74.60 UNSPECIFIED CIRRHOSIS OF LIVER: ICD-10-CM

## 2025-09-08 DIAGNOSIS — K76.82 HEPATIC ENCEPHALOPATHY: ICD-10-CM

## 2025-09-08 PROCEDURE — 99214 OFFICE O/P EST MOD 30 MIN: CPT

## 2025-09-08 RX ORDER — RIFAXIMIN 550 MG/1
550 TABLET ORAL
Qty: 60 | Refills: 11 | Status: ACTIVE | COMMUNITY
Start: 2025-09-08 | End: 1900-01-01

## 2025-09-08 RX ORDER — LACTULOSE 10 G/15ML
10 SOLUTION ORAL 3 TIMES DAILY
Qty: 4050 | Refills: 3 | Status: ACTIVE | COMMUNITY
Start: 2025-09-08 | End: 1900-01-01

## 2025-09-08 RX ORDER — PANTOPRAZOLE 40 MG/1
40 TABLET, DELAYED RELEASE ORAL
Qty: 1 | Refills: 0 | Status: ACTIVE | COMMUNITY
Start: 2025-09-08 | End: 1900-01-01

## 2025-09-17 ENCOUNTER — NON-APPOINTMENT (OUTPATIENT)
Age: 45
End: 2025-09-17